# Patient Record
Sex: FEMALE | Race: WHITE | Employment: UNEMPLOYED | URBAN - METROPOLITAN AREA
[De-identification: names, ages, dates, MRNs, and addresses within clinical notes are randomized per-mention and may not be internally consistent; named-entity substitution may affect disease eponyms.]

---
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
## 2020-08-28 ENCOUNTER — HOSPITAL ENCOUNTER (OUTPATIENT)
Age: 20
Setting detail: SPECIMEN
Discharge: HOME | End: 2020-08-28
Payer: COMMERCIAL

## 2020-08-30 LAB
C. TRACHOMATIS DNA ,URINE: NEGATIVE
N. GONORRHOEAE DNA, URINE: NEGATIVE
SPECIMEN DESCRIPTION: NORMAL

## 2021-02-23 ENCOUNTER — HOSPITAL ENCOUNTER (OUTPATIENT)
Facility: CLINIC | Age: 21
Discharge: HOME | End: 2021-02-23
Payer: COMMERCIAL

## 2021-02-23 LAB
ABSOLUTE EOS #: 0.24 K/UL (ref 0–0.44)
ABSOLUTE IMMATURE GRANULOCYTE: <0.03 K/UL (ref 0–0.3)
ABSOLUTE LYMPH #: 1.76 K/UL (ref 1.1–3.7)
ABSOLUTE MONO #: 0.41 K/UL (ref 0.1–1.4)
BASOPHILS # BLD: 0 % (ref 0–2)
BASOPHILS ABSOLUTE: <0.03 K/UL (ref 0–0.2)
DIFFERENTIAL TYPE: NORMAL
EOSINOPHILS RELATIVE PERCENT: 4 % (ref 1–4)
HCT VFR BLD CALC: 41.9 % (ref 36.3–47.1)
HEMOGLOBIN: 13.9 G/DL (ref 11.9–15.1)
IMMATURE GRANULOCYTES: 0 %
LYMPHOCYTES # BLD: 28 % (ref 25–45)
MCH RBC QN AUTO: 28.8 PG (ref 25.2–33.5)
MCHC RBC AUTO-ENTMCNC: 33.2 G/DL (ref 28.4–34.8)
MCV RBC AUTO: 86.9 FL (ref 82.6–102.9)
MONOCYTES # BLD: 7 % (ref 2–8)
NRBC AUTOMATED: 0 PER 100 WBC
PDW BLD-RTO: 12.1 % (ref 11.8–14.4)
PLATELET # BLD: 293 K/UL (ref 138–453)
PLATELET ESTIMATE: NORMAL
PMV BLD AUTO: 11 FL (ref 8.1–13.5)
RBC # BLD: 4.82 M/UL (ref 3.95–5.11)
RBC # BLD: NORMAL 10*6/UL
SEG NEUTROPHILS: 61 % (ref 34–64)
SEGMENTED NEUTROPHILS ABSOLUTE COUNT: 3.85 K/UL (ref 1.5–8.1)
WBC # BLD: 6.3 K/UL (ref 4.5–13.5)
WBC # BLD: NORMAL 10*3/UL

## 2021-02-23 PROCEDURE — 82306 VITAMIN D 25 HYDROXY: CPT

## 2021-02-23 PROCEDURE — 84443 ASSAY THYROID STIM HORMONE: CPT

## 2021-02-23 PROCEDURE — 85025 COMPLETE CBC W/AUTO DIFF WBC: CPT

## 2021-02-23 PROCEDURE — 36415 COLL VENOUS BLD VENIPUNCTURE: CPT

## 2021-02-23 PROCEDURE — 82728 ASSAY OF FERRITIN: CPT

## 2021-02-23 PROCEDURE — 86140 C-REACTIVE PROTEIN: CPT

## 2021-02-23 PROCEDURE — 84439 ASSAY OF FREE THYROXINE: CPT

## 2021-02-23 PROCEDURE — 80053 COMPREHEN METABOLIC PANEL: CPT

## 2021-02-24 LAB
ALBUMIN SERPL-MCNC: 4.1 G/DL (ref 3.5–5.2)
ALBUMIN/GLOBULIN RATIO: 1.2 (ref 1–2.5)
ALP BLD-CCNC: 76 U/L (ref 35–104)
ALT SERPL-CCNC: 18 U/L (ref 5–33)
ANION GAP SERPL CALCULATED.3IONS-SCNC: 15 MMOL/L (ref 9–17)
AST SERPL-CCNC: 16 U/L
BILIRUB SERPL-MCNC: 0.29 MG/DL (ref 0.3–1.2)
BUN BLDV-MCNC: 9 MG/DL (ref 6–20)
BUN/CREAT BLD: ABNORMAL (ref 9–20)
C-REACTIVE PROTEIN: 4 MG/L (ref 0–5)
CALCIUM SERPL-MCNC: 9.1 MG/DL (ref 8.6–10.4)
CHLORIDE BLD-SCNC: 108 MMOL/L (ref 98–107)
CO2: 19 MMOL/L (ref 20–31)
CREAT SERPL-MCNC: 0.64 MG/DL (ref 0.5–0.9)
FERRITIN: 54 UG/L (ref 13–150)
GFR AFRICAN AMERICAN: >60 ML/MIN
GFR NON-AFRICAN AMERICAN: >60 ML/MIN
GFR SERPL CREATININE-BSD FRML MDRD: ABNORMAL ML/MIN/{1.73_M2}
GFR SERPL CREATININE-BSD FRML MDRD: ABNORMAL ML/MIN/{1.73_M2}
GLUCOSE BLD-MCNC: 118 MG/DL (ref 70–99)
POTASSIUM SERPL-SCNC: 4.2 MMOL/L (ref 3.7–5.3)
SODIUM BLD-SCNC: 142 MMOL/L (ref 135–144)
THYROXINE, FREE: 1.24 NG/DL (ref 0.93–1.7)
TOTAL PROTEIN: 7.6 G/DL (ref 6.4–8.3)
TSH SERPL DL<=0.05 MIU/L-ACNC: 2.01 MIU/L (ref 0.3–5)
VITAMIN D 25-HYDROXY: 18.1 NG/ML (ref 30–100)

## 2021-04-13 ENCOUNTER — HOSPITAL ENCOUNTER (OUTPATIENT)
Age: 21
Setting detail: SPECIMEN
Discharge: HOME | End: 2021-04-13
Payer: COMMERCIAL

## 2021-04-13 ENCOUNTER — OFFICE VISIT (OUTPATIENT)
Dept: OBGYN CLINIC | Age: 21
End: 2021-04-13
Payer: COMMERCIAL

## 2021-04-13 VITALS
WEIGHT: 247.8 LBS | HEART RATE: 100 BPM | BODY MASS INDEX: 41.29 KG/M2 | HEIGHT: 65 IN | SYSTOLIC BLOOD PRESSURE: 130 MMHG | TEMPERATURE: 98.2 F | DIASTOLIC BLOOD PRESSURE: 78 MMHG

## 2021-04-13 DIAGNOSIS — N94.6 DYSMENORRHEA: ICD-10-CM

## 2021-04-13 DIAGNOSIS — Z01.419 WOMEN'S ANNUAL ROUTINE GYNECOLOGICAL EXAMINATION: Primary | ICD-10-CM

## 2021-04-13 PROCEDURE — G0145 SCR C/V CYTO,THINLAYER,RESCR: HCPCS

## 2021-04-13 PROCEDURE — 99385 PREV VISIT NEW AGE 18-39: CPT | Performed by: NURSE PRACTITIONER

## 2021-04-13 RX ORDER — MEDROXYPROGESTERONE ACETATE 150 MG/ML
1 INJECTION, SUSPENSION INTRAMUSCULAR
COMMUNITY

## 2021-04-13 SDOH — HEALTH STABILITY: MENTAL HEALTH: HOW MANY STANDARD DRINKS CONTAINING ALCOHOL DO YOU HAVE ON A TYPICAL DAY?: NOT ASKED

## 2021-04-13 SDOH — HEALTH STABILITY: MENTAL HEALTH: HOW OFTEN DO YOU HAVE A DRINK CONTAINING ALCOHOL?: MONTHLY OR LESS

## 2021-04-13 ASSESSMENT — PATIENT HEALTH QUESTIONNAIRE - PHQ9
SUM OF ALL RESPONSES TO PHQ QUESTIONS 1-9: 0
2. FEELING DOWN, DEPRESSED OR HOPELESS: 0
1. LITTLE INTEREST OR PLEASURE IN DOING THINGS: 0

## 2021-04-13 NOTE — PROGRESS NOTES
History and Physical  [address], [address]  [address], [address]. [phone]   Fax [phone]  Tyler Ma  2021              20 y.o. Chief Complaint   Patient presents with    Established New Doctor       No LMP recorded (lmp unknown). Patient has had an injection. Primary Care Physician: Andrea Knight MD    The patient was seen and examined. She has no chief complaint today and is here for her annual exam.  Her bowels are regular. There are no voiding complaints. She denies any bloating. She denies vaginal discharge and was counseled on STD's and the need for barrier contraception. HPI : Tyler Ma is a 23 y.o. female [place]    Annual exam  Here to establish care  Previously receiving depo injections at her pediatricians office, DR Judd Brasher- Pediatric care. Desires to continue here instead. Next dose due 2021. Been on depo injections for 2 years for irregular menses. Not having menses while on depo. Currently sexually active.   Declines STD testing.  ________________________________________________________________________  OB History    Para Term  AB Living   0 0 0 0 0 0   SAB TAB Ectopic Molar Multiple Live Births   0 0 0 0 0 0     Past Medical History:   Diagnosis Date    Cholecystitis     gallstones                                                                   Past Surgical History:   Procedure Laterality Date    CHOLECYSTECTOMY, LAPAROSCOPIC  2017    CHOLECYSTECTOMY, LAPAROSCOPIC N/A 2017    CHOLECYSTECTOMY LAPAROSCOPIC, POSSIBLE COMMON BILE DUCT EXPLORATION, POSSIBLE IOC, **SHORT STAY** performed by Herminio Patel MD at [address] [facility]       Family History   Problem Relation Age of Onset    Other Maternal Grandmother         gallbladder disease    Other Paternal Grandmother         gallbladder disease    Bleeding Prob Neg Hx     Seizures Neg Hx     Known Allergies)       Symptoms of decreased mood absent  Symptoms of anhedonia absent    **If either question is answered in a  positive fashion then complete the PHQ9 Scoring Evaluation and make the appropriate referral**      Immunization status: stated as current, but no records available. Gynecologic History:  Menarche: 14 yo  Menopause at [age] yo     No LMP recorded (lmp unknown). Patient has had an injection. Sexually Active: Yes    STD History: No     Permanent Sterilization: No   Reversible Birth Control: No        Hormone Replacement Exposure: No      Genetic Qualified Family History of Breast, Ovarian , Colon or Uterine Cancer: No     If YES see scanned worksheet. Preventative Health Testing:    Health Maintenance:  Health Maintenance Due   Topic Date Due    Hepatitis C screen  Never done    HIV screen  Never done    COVID-19 Vaccine (1) Never done    Cervical cancer screen  Never done       Date of Last Pap Smear: NA  Abnormal Pap Smear History: NA  Colposcopy History:   Date of Last Mammogram: NA  Date of Last Colonoscopy:   Date of Last Bone Density:      ________________________________________________________________________        REVIEW OF SYSTEMS:    yes   A minimum of an eleven point review of systems was completed. Review Of Systems (11 point):  Constitutional: No fever, chills or malaise;  No weight change or fatigue  Head and Eyes: No vision, Headache, Dizziness or trauma in last 12 months  ENT ROS: No hearing, Tinnitis, sinus or taste problems  Hematological and Lymphatic ROS:No Lymphoma, Von Willebrand's, Hemophillia or Bleeding History  Psych ROS: No Depression, Homicidal thoughts,suicidal thoughts, or anxiety  Breast ROS: No prior breast abnormalities or lumps  Respiratory ROS: No SOB, Pneumoniae,Cough, or Pulmonary Embolism History  Cardiovascular ROS: No Chest Pain with Exertion, Palpitations, Syncope, Edema, Arrhythmia  Gastrointestinal ROS: No Indigestion, Heartburn, Nausea, vomiting, Diarrhea, Constipation,or Bowel Changes; No Bloody Stools or melena  Genito-Urinary ROS: No Dysuria, Hematuria or Nocturia. No Urinary Incontinence or Vaginal Discharge  Musculoskeletal ROS: No Arthralgia, Arthritis,Gout,Osteoporosis or Rheumatism  Neurological ROS: No CVA, Migraines, Epilepsy, Seizure Hx, or Limb Weakness  Dermatological ROS: No Rash, Itching, Hives, Mole Changes or Cancer                                                                                                                                                                                                                                  PHYSICAL Exam:     Constitutional:  Vitals:    04/13/21 1139   BP: 130/78   Site: Left Upper Arm   Position: Sitting   Cuff Size: Large Adult   Pulse: 100   Temp: 98.2 °F (36.8 °C)   TempSrc: Temporal   Weight: 247 lb 12.8 oz (112.4 kg)   Height: 5' 5\" (1.651 m)       Chaperone for Intimate Exam   Chaperone was offered and accepted as part of the rooming process.  Chaperone: Netta          General Appearance: This  is a well Developed, well Nourished, well groomed female. Her BMI was reviewed. Nutritional decision making was discussed. Skin:  There was a Normal Inspection of the skin without rashes or lesions. There were no rashes. (Papular, Maculopapular, Hives, Pustular, Macular)     There were no lesions (Ulcers, Erythema, Abn. Appearing Nevi)            Lymphatic:  No Lymph Nodes were Palpable in the neck , axilla or groin.  0 # Of Lymph Nodes; Location ; Character [Normal]  [Shotty] [Tender] [Enlarged]     Neck and EENT:  The neck was supple. There were no masses   The thyroid was not enlarged and had no masses. Perrla, EOMI B/L, TMI B/L No Abnormalities. Throat inspected-No exudates or Masses, Nares Patent No Masses        Respiratory: The lungs were auscultated and found to be clear. There were no rales, rhonchi or wheezes. There was a good respiratory effort. Cardiovascular: The heart was in a regular rate and rhythm. . No S3 or S4. There was no murmur appreciated. Location, grade, and radiation are not applicable. Extremities: The patients extremities were without calf tenderness, edema, or varicosities. There was full range of motion in all four extremities. Pulses in all four extremities were appreciated and are 2/4. Abdomen: The abdomen was soft and non-tender. There were good bowel sounds in all quadrants and there was no guarding, rebound or rigidity. On evaluation there was no evidence of hepatosplenomegaly and there was no costal vertebral kiana tenderness bilaterally. No hernias were appreciated. Abdominal Scars: cholecystectomy scar    Psych: The patient had a normal Orientation to: Time, Place, Person, and Situation  There is no Mood / Affect changes    Breast:  (Chest)  normal appearance, no masses or tenderness. Left breast > right breast  Self breast exams were reviewed in detail. Literature was given. Pelvic Exam:  Vulva and vagina appear normal. Bimanual exam reveals normal uterus and adnexa. Rectal Exam:  exam declined by patient          Musculosk:  Normal Gait and station was noted. Digits were evaluated without abnormal findings. Range of motion, stability and strength were evaluated and found to be appropriate for the patients age. ASSESSMENT:      23 y.o. Annual   Diagnosis Orders   1. Women's annual routine gynecological examination  PAP SMEAR          Chief Complaint   Patient presents with   Alvia Brunner Doctor          Past Medical History:   Diagnosis Date    Cholecystitis     gallstones         There are no active problems to display for this patient. Hereditary Breast, Ovarian, Colon and Uterine Cancer screening Done. Tobacco & Secondary smoke risks reviewed; instructed on cessation and avoidance      Counseling Completed:  Preventative Health Recommendations and Follow up.   The patient was informed of the recommended preventative health recommendations. 1. Annuals every year; Cytology collections per prevailing guidelines. 2. Mammograms begin every year at 37 yo if no abnormalities are found and no family history. 3. Bone density studies every 2-3 years. Begin at 73 yo. If no fracture history or osteoporosis family history. (significant). 4. Colonoscopy begin at 40 yo. Repeat every ten years if negative and no family history. 5. Calcium of 7311-5979 mg/day in split dosing  6. Vitamin D 400-800 IU/day  7. All other preventative health recommendations will be managed by the patients Primary care physician. Counseling Hormonal Based Birth Control:      The patient was seen and counseled on all forms of birth control both male and female  reversible and non. She is aware that hormonal based birth control may increase her risk of developing a blood clot which may increase her morbidity and or mortality. She was counseled on alternate non hormonal based contraception options. We discussed that smoking and any hormonal based contraception may increase the patients risks of developing these life threatening blood clots. All patients are encouraged to stop smoking at the time of contraceptive counseling. Cessation programs were reviewed. The patient was instructed to use barrier contraception for sexually transmitted disease prevention. The patient was also informed of antibiotics decreasing contraceptive efficacy and the need for barrier contraception from the onset of her antibiotic dosing and through a minimum of thirty days from antibiotic cessation. The life threatening side effect profile was reviewed in detail this includes but is not limited to shortness of breath, chest pain, severe or persistent headaches, or calf pain.   If any of these occur the patient has been instructed to stop using her hormonal based contraception, notify the office, and go to the emergency department or call 911. The patient denied any personal history of blood clots in her leg, lung, or heart and denied any family history of stroke, TIA, sudden cardiac death < 36 y.o.,pulmonary embolism, or deep venous thrombosis. PLAN:  Return in about 1 month (around 5/13/2021) for depo injection. Pap smear obtained. Signs hormone consent form. Will return to our office for next depo- May 13. Repeat Annual every 1 year  Cervical Cytology Evaluation begins at 24years old. If Negative Cytology, Follow-up screening per current guidelines. Mammograms every 1 year. If 35 yo and last mammogram was negative. Calcium and Vitamin D dosing reviewed. Colonoscopy screening reviewed as well as onset for bone density testing. Birth control and barrier recommendations discussed. STD counseling and prevention reviewed. Gardisil counseling completed for all patients 10-35 yo. Routine health maintenance per patients PCP. Orders Placed This Encounter   Procedures    PAP SMEAR     Patient History:    No LMP recorded (lmp unknown). Patient has had an injection. OBGYN Status: Injection  Past Surgical History:  09/22/2017: CHOLECYSTECTOMY, LAPAROSCOPIC  9/22/2017: CHOLECYSTECTOMY, LAPAROSCOPIC; N/A      Comment:  CHOLECYSTECTOMY LAPAROSCOPIC, POSSIBLE COMMON BILE DUCT                EXPLORATION, POSSIBLE IOC, **SHORT STAY** performed by                Trevor Coles MD at [place] [address]  No date: TYMPANOSTOMY TUBE PLACEMENT  Medications/Contraceptives Affecting Cytology     Progestin Contraceptives - Injectable Disp Start End     medroxyPROGESTERone (DEPO-PROVERA) 150 MG/ML injection          Class: Historical Med       Social History    Tobacco Use      Smoking status: Never Smoker      Smokeless tobacco: Never Used       Standing Status:   Future     Standing Expiration Date:   4/13/2022     Order Specific Question:   Collection Type     Answer:    Thin Prep     Order Specific Question:   Prior Abnormal Pap Test Answer:   No     Order Specific Question:   Screening or Diagnostic     Answer:   Screening     Order Specific Question:   HPV Requested? Answer:   N/A     Order Specific Question:   High Risk Patient     Answer:   N/A           The patient, Nelly Sapp is a 23 y.o. female, was seen with a total time spent of 20 minutes for the visit on this date of service by the E/M provider. The time component had both face to face and non face to face time spent in determining the total time component. Counseling and education regarding her diagnosis listed below and her options regarding those diagnoses were also included in determining her time component. Diagnosis Orders   1. Women's annual routine gynecological examination  PAP SMEAR        The patient had her preventative health maintenance recommendations and follow-up reviewed with her at the completion of her visit.

## 2021-04-15 LAB — CYTOLOGY REPORT: NORMAL

## 2021-05-13 ENCOUNTER — NURSE ONLY (OUTPATIENT)
Dept: OBGYN CLINIC | Age: 21
End: 2021-05-13
Payer: COMMERCIAL

## 2021-05-13 VITALS
SYSTOLIC BLOOD PRESSURE: 126 MMHG | DIASTOLIC BLOOD PRESSURE: 86 MMHG | BODY MASS INDEX: 41.1 KG/M2 | HEART RATE: 80 BPM | WEIGHT: 247 LBS

## 2021-05-13 DIAGNOSIS — N94.6 DYSMENORRHEA: Primary | ICD-10-CM

## 2021-05-13 DIAGNOSIS — Z32.02 NEGATIVE PREGNANCY TEST: ICD-10-CM

## 2021-05-13 LAB
CONTROL: NORMAL
PREGNANCY TEST URINE, POC: NEGATIVE

## 2021-05-13 PROCEDURE — 96372 THER/PROPH/DIAG INJ SC/IM: CPT | Performed by: NURSE PRACTITIONER

## 2021-05-13 PROCEDURE — 81025 URINE PREGNANCY TEST: CPT | Performed by: NURSE PRACTITIONER

## 2021-05-13 RX ORDER — MEDROXYPROGESTERONE ACETATE 150 MG/ML
150 INJECTION, SUSPENSION INTRAMUSCULAR ONCE
Status: COMPLETED | OUTPATIENT
Start: 2021-05-13 | End: 2021-05-13

## 2021-05-13 RX ADMIN — MEDROXYPROGESTERONE ACETATE 150 MG: 150 INJECTION, SUSPENSION INTRAMUSCULAR at 11:25

## 2021-05-13 NOTE — PROGRESS NOTES
Patient presents to office for depo injection. Patient within 12 weeks since last administration; last given by pediatrician's office. Patient current with office for annual exam, due after 4/13/22. Urine hcg test completed prior to administration, resulting negative. Depo administrated per provider's orders. Patient tolerated injection to right deltoid muscle well. See MAR for details. 12 week injection scheduled upon check out.       Depo  Lot [id number]  Exp 8/22

## 2021-07-27 ENCOUNTER — NURSE TRIAGE (OUTPATIENT)
Dept: OTHER | Facility: CLINIC | Age: 21
End: 2021-07-27

## 2021-07-27 ENCOUNTER — APPOINTMENT (OUTPATIENT)
Dept: INTERVENTIONAL RADIOLOGY/VASCULAR | Age: 21
DRG: 103 | End: 2021-07-27
Payer: COMMERCIAL

## 2021-07-27 ENCOUNTER — APPOINTMENT (OUTPATIENT)
Dept: MRI IMAGING | Age: 21
DRG: 103 | End: 2021-07-27
Payer: COMMERCIAL

## 2021-07-27 ENCOUNTER — HOSPITAL ENCOUNTER (EMERGENCY)
Age: 21
Discharge: HOME | DRG: 103 | End: 2021-07-27
Attending: EMERGENCY MEDICINE
Payer: COMMERCIAL

## 2021-07-27 VITALS
TEMPERATURE: 97.4 F | RESPIRATION RATE: 18 BRPM | HEART RATE: 85 BPM | DIASTOLIC BLOOD PRESSURE: 81 MMHG | OXYGEN SATURATION: 100 % | SYSTOLIC BLOOD PRESSURE: 135 MMHG

## 2021-07-27 DIAGNOSIS — G93.2 IIH (IDIOPATHIC INTRACRANIAL HYPERTENSION): ICD-10-CM

## 2021-07-27 DIAGNOSIS — H47.10 PAPILLEDEMA: Primary | ICD-10-CM

## 2021-07-27 LAB
APPEARANCE CSF: CLEAR
CRYPTOCOCCUS NEOFORMANS/GATTI CSF FILM ARR.: NOT DETECTED
CYTOMEGALOVIRUS (CMV) CSF FILM ARRAY: NOT DETECTED
ENTEROVIRUS CSF FILM ARRAY: NOT DETECTED
ESCHERICHIA COLI K1 CSF FILM ARRAY: NOT DETECTED
GLUCOSE, CSF: 50 MG/DL (ref 40–70)
HAEMOPHILUS INFLUENZA CSF FILM ARRAY: NOT DETECTED
HCG(URINE) PREGNANCY TEST: NEGATIVE
HHV-6 (HERPESVIRUS 6) CSF FILM ARRAY: NOT DETECTED
HSV-1 CSF FILM ARRAY: NOT DETECTED
HSV-2 CSF FILM ARRAY: NOT DETECTED
LISTERIA MONOCYTOGENES CSF FILM ARRAY: NOT DETECTED
NEISSERIA MENIGITIDIS CSF FILM ARRAY: NOT DETECTED
PARECHOVIRUS CSF FILM ARRAY: NOT DETECTED
PROTEIN CSF: 22.1 MG/DL (ref 15–45)
RBC CSF: 0 /MM3
SPECIMEN DESCRIPTION: NORMAL
STREPTOCOCCUS AGALACTIAE CSF FILM ARRAY: NOT DETECTED
STREPTOCOCCUS PNEUMONIAE CSF FILM ARRAY: NOT DETECTED
SUPERNAT COLOR CSF: NORMAL
TUBE NUMBER CSF: 3
VARICELLA-ZOSTER CSF FILM ARRAY: NOT DETECTED
VOLUME CSF: 7
WBC CSF: 0 /MM3
XANTHOCHROMIA: NORMAL

## 2021-07-27 PROCEDURE — 84157 ASSAY OF PROTEIN OTHER: CPT

## 2021-07-27 PROCEDURE — 2709999900 HC NON-CHARGEABLE SUPPLY

## 2021-07-27 PROCEDURE — 82945 GLUCOSE OTHER FLUID: CPT

## 2021-07-27 PROCEDURE — 6360000004 HC RX CONTRAST MEDICATION: Performed by: STUDENT IN AN ORGANIZED HEALTH CARE EDUCATION/TRAINING PROGRAM

## 2021-07-27 PROCEDURE — 89050 BODY FLUID CELL COUNT: CPT

## 2021-07-27 PROCEDURE — 87483 CNS DNA AMP PROBE TYPE 12-25: CPT

## 2021-07-27 PROCEDURE — 62328 DX LMBR SPI PNXR W/FLUOR/CT: CPT | Performed by: RADIOLOGY

## 2021-07-27 PROCEDURE — 81025 URINE PREGNANCY TEST: CPT

## 2021-07-27 PROCEDURE — 70546 MR ANGIOGRAPH HEAD W/O&W/DYE: CPT

## 2021-07-27 PROCEDURE — 87070 CULTURE OTHR SPECIMN AEROBIC: CPT

## 2021-07-27 PROCEDURE — 009U3ZX DRAINAGE OF SPINAL CANAL, PERCUTANEOUS APPROACH, DIAGNOSTIC: ICD-10-PCS | Performed by: RADIOLOGY

## 2021-07-27 PROCEDURE — 99284 EMERGENCY DEPT VISIT MOD MDM: CPT

## 2021-07-27 PROCEDURE — 70553 MRI BRAIN STEM W/O & W/DYE: CPT

## 2021-07-27 PROCEDURE — 6370000000 HC RX 637 (ALT 250 FOR IP): Performed by: STUDENT IN AN ORGANIZED HEALTH CARE EDUCATION/TRAINING PROGRAM

## 2021-07-27 PROCEDURE — 87205 SMEAR GRAM STAIN: CPT

## 2021-07-27 PROCEDURE — 87015 SPECIMEN INFECT AGNT CONCNTJ: CPT

## 2021-07-27 PROCEDURE — A9576 INJ PROHANCE MULTIPACK: HCPCS | Performed by: STUDENT IN AN ORGANIZED HEALTH CARE EDUCATION/TRAINING PROGRAM

## 2021-07-27 RX ORDER — ACETAZOLAMIDE 250 MG/1
250 TABLET ORAL 2 TIMES DAILY
Qty: 90 TABLET | Refills: 3 | Status: SHIPPED | OUTPATIENT
Start: 2021-07-27 | End: 2022-02-24 | Stop reason: SDUPTHER

## 2021-07-27 RX ORDER — ACETAZOLAMIDE 250 MG/1
250 TABLET ORAL 2 TIMES DAILY
Status: DISCONTINUED | OUTPATIENT
Start: 2021-07-27 | End: 2021-07-27 | Stop reason: HOSPADM

## 2021-07-27 RX ORDER — SODIUM CHLORIDE 0.9 % (FLUSH) 0.9 %
5-40 SYRINGE (ML) INJECTION 2 TIMES DAILY
Status: DISCONTINUED | OUTPATIENT
Start: 2021-07-27 | End: 2021-07-27 | Stop reason: HOSPADM

## 2021-07-27 RX ADMIN — ACETAZOLAMIDE 250 MG: 250 TABLET ORAL at 15:09

## 2021-07-27 RX ADMIN — GADOTERIDOL 20 ML: 279.3 INJECTION, SOLUTION INTRAVENOUS at 13:11

## 2021-07-27 ASSESSMENT — ENCOUNTER SYMPTOMS
EYE PAIN: 0
SHORTNESS OF BREATH: 0
BACK PAIN: 0
EYE REDNESS: 0
PHOTOPHOBIA: 0
ABDOMINAL DISTENTION: 0
PHOTOPHOBIA: 1
APNEA: 0
WHEEZING: 0
VOMITING: 0
DIARRHEA: 0
CONSTIPATION: 0
ABDOMINAL PAIN: 0
CHEST TIGHTNESS: 0
EYE DISCHARGE: 0
STRIDOR: 0
COUGH: 0
NAUSEA: 0
SORE THROAT: 0

## 2021-07-27 NOTE — BRIEF OP NOTE
Brief Postoperative Note Lumbar Puncture    Glo Uriarte  YOB: 2000  [id number]    Pre-operative Diagnosis: Papilledema     Post-operative Diagnosis: Same    Procedure: Fluoro guided Lumbar Puncture    Anesthesia: 1% Lidocaine    Surgeons/Assistants: Kyara Arenas PA-C    Complications: None    EBL: Minimal    Specimens: Obtained and sent to lab    Fluoroscopy guided lumbar puncture. 20 gauge spinal needle. L3-L4. 10 ml clear CSF obtained. Opening pressure of 28 cm of H2O and closing pressure of 18 cm of H2O Dressing applied. Instructions given.     Electronically signed by AMARIS Lucia on 7/27/2021 at 3:59 PM

## 2021-07-27 NOTE — CONSULTS
Neurology Consult Note      Reason for Consult:  papillidema  Requesting Physician:  Dr. Colón Door:  papillidema    History Obtained From:  patient, mother, electronic medical record       HISTORY OF PRESENT ILLNESS:              The patient is a 23 y.o. female presenting today as a referral from her optometrists office for bilateral papilledema. She was at the office for a routine glasses check. Reports a week long headache that is bifrontal R>L non radiating, pounding with photophobia that started suddenly 7 days prior. Reports being on acne medication and depo-provera. Denies visual disturbance, loss of vision or trauma. She reports no fevers, chills, night sweats or unexpected weight loss. BMI 41, non smoker. Past Medical History:        Diagnosis Date    Cholecystitis     gallstones     Past Surgical History:        Procedure Laterality Date    CHOLECYSTECTOMY, LAPAROSCOPIC  09/22/2017    CHOLECYSTECTOMY, LAPAROSCOPIC N/A 9/22/2017    CHOLECYSTECTOMY LAPAROSCOPIC, POSSIBLE COMMON BILE DUCT EXPLORATION, POSSIBLE IOC, **SHORT STAY** performed by Dov Leiva MD at [id number] [place] [address]       Current Medications:   No current facility-administered medications for this encounter. Allergies:  Patient has no known allergies. Social History:  TOBACCO:   reports that she has never smoked. She has never used smokeless tobacco.  ETOH:   reports current alcohol use. DRUGS:   reports no history of drug use. ACTIVITIES OF DAILY LIVING:    INSTRUMENTAL ACTIVITIES OF DAILY LIVING:  Patient is able to perform all instrumental activities of daily living.     Family History:       Problem Relation Age of Onset    Other Maternal Grandmother         gallbladder disease    Other Paternal Grandmother         gallbladder disease    Bleeding Prob Neg Hx     Seizures Neg Hx     Anesth Problems Neg Hx        REVIEW OF SYSTEMS:  Review of Systems   Constitutional: Negative for activity change, appetite change, chills, diaphoresis, fatigue, fever and unexpected weight change. HENT: Negative for sore throat. Eyes: Positive for photophobia. Negative for discharge, redness and visual disturbance. Respiratory: Negative for apnea, cough, chest tightness, shortness of breath, wheezing and stridor. Cardiovascular: Negative for chest pain and leg swelling. Gastrointestinal: Negative for abdominal distention, abdominal pain, constipation, diarrhea, nausea and vomiting. Genitourinary: Negative for difficulty urinating, dysuria, flank pain, frequency, hematuria and urgency. Musculoskeletal: Negative for arthralgias. Neurological: Positive for headaches. Negative for dizziness, tremors, seizures, syncope, facial asymmetry, speech difficulty, weakness, light-headedness and numbness. Hematological: Negative for adenopathy. PHYSICAL EXAM:    Vitals:  BP (!) 142/94   Pulse 84   Temp 97.4 °F (36.3 °C)   Resp 18   SpO2 99%        Physical Exam  Vitals and nursing note reviewed. Constitutional:       General: She is not in acute distress. Appearance: She is well-developed. She is not diaphoretic. HENT:      Head: Normocephalic and atraumatic. Right Ear: External ear normal.      Left Ear: External ear normal.   Eyes:      General: No scleral icterus. Right eye: No discharge. Left eye: No discharge. Conjunctiva/sclera: Conjunctivae normal.      Funduscopic exam:     Right eye: Papilledema present. Red reflex present. Left eye: Papilledema present. Red reflex present. Pulmonary:      Effort: Pulmonary effort is normal.   Abdominal:      General: There is no distension. Palpations: Abdomen is soft. Tenderness: There is no abdominal tenderness. There is no guarding. Musculoskeletal:         General: No tenderness or deformity. Normal range of motion. Cervical back: Normal range of motion.    Skin:     General: Skin is warm and dry. Capillary Refill: Capillary refill takes less than 2 seconds. Findings: No erythema or rash. Neurological:      Mental Status: She is alert and oriented to person, place, and time. GCS: GCS eye subscore is 4. GCS verbal subscore is 5. GCS motor subscore is 6. Cranial Nerves: No cranial nerve deficit. Sensory: No sensory deficit. Motor: No abnormal muscle tone or seizure activity. Coordination: Coordination normal.      Deep Tendon Reflexes: Reflexes are normal and symmetric. Reflexes normal.      Reflex Scores:       Tricep reflexes are 2+ on the right side and 2+ on the left side. Bicep reflexes are 2+ on the right side and 2+ on the left side. Brachioradialis reflexes are 2+ on the right side and 2+ on the left side. Patellar reflexes are 2+ on the right side and 2+ on the left side. Achilles reflexes are 2+ on the right side and 2+ on the left side. Psychiatric:         Behavior: Behavior normal.         IMAGING    MRV HEAD W WO CONTRAST    Result Date: 7/27/2021  Unremarkable MRV of the head. MRI BRAIN W WO CONTRAST    Result Date: 7/27/2021  1. Flattening of the posterior globes at the optic nerve insertions compatible with provided history of papilledema. 2. No intracranial mass, acute infarction, hydrocephalus, or midline shift. IMPRESSION     Case of a 22 yo F referred to the ED by her optometrist for papilledema during a routine eye exam. Bifrontal R>L bifrontal headaches ongoing for 1 week suspicious for IIH. Non focal exam.      RECOMMENDATIONS:   1. MRI Brain W WO - consistent with IIH  2. MRV Brain W WO - negative  3. Plan for LP with opening/closing pressures. Additionally will obtain basic CSF studies. 1. Lay flat for 2 hours. 4. Start diamox 250 mg PO BID  5. Follow up with neurology in 2 weeks    Thank you for the consultation. Will follow.        Dottie Barroso MD, [facility]  PGY-4 Neurology  7/27/2021 at 11:41

## 2021-07-27 NOTE — TELEPHONE ENCOUNTER
Reason for Disposition   Caller has already spoken with the PCP and has no further questions. Protocols used: NO CONTACT OR DUPLICATE CONTACT CALL-ADULT-    Patient was at eye doctor and he sent her to ED for optic nerve swelling.

## 2021-07-27 NOTE — ED PROVIDER NOTES
[facility] [address] [facility]     Emergency Department     Faculty Attestation        I performed a history and physical examination of the patient and discussed management with the resident. I reviewed the residents note and agree with the documented findings and plan of care. Any areas of disagreement are noted on the chart. I was personally present for the key portions of any procedures. I have documented in the chart those procedures where I was not present during the key portions. I have reviewed the emergency nurses triage note. I agree with the chief complaint, past medical history, past surgical history, allergies, medications, social and family history as documented unless otherwise noted below. For mid-level providers such as nurse practitioners as well as physicians assistants:    I have personally seen and evaluated the patient. I find the patient's history and physical exam are consistent with NP/PA documentation. I agree with the care provided, treatment rendered, disposition, & follow-up plan. Additional findings are as noted. Vital Signs: BP (!) 142/94   Pulse 84   Temp 97.4 °F (36.3 °C)   Resp 18   SpO2 99%   PCP:  Jane Deras MD    Pertinent Comments:     Patient had a routine eye exam today ophthalmologist was noted to have papilledema. Patient has had a mild headache for the past week is on birth control. No history of intracranial hypertension. She is afebrile nontoxic on exam nonfocal neurological deficit.   Will discuss with neurology in regards to work-up and need for lumbar puncture      Critical Care  None          Alysha Hoang MD    Attending Emergency Medicine Physician              Jose Antonio Nuñez MD  07/27/21 1036

## 2021-07-27 NOTE — ED NOTES
Pt resting on stretcher, in NAD, RR even and unlabored. Pt updated on plan of care. Will continue plan of care. Call light within reach.        Landry Vora RN  07/27/21 0087

## 2021-07-27 NOTE — ED PROVIDER NOTES
[facility] ED  Emergency Department  Emergency Medicine Resident Sign-out     Care of Faye Sharma was assumed from Dr. Deonna Cabrera and is being seen for Other   . The patient's initial evaluation and plan have been discussed with the prior provider who initially evaluated the patient. EMERGENCY DEPARTMENT COURSE / MEDICAL DECISION MAKING:       MEDICATIONS GIVEN:  Orders Placed This Encounter   Medications    gadoteridol (PROHANCE) injection 20 mL    sodium chloride flush 0.9 % injection 5-40 mL    acetaZOLAMIDE (DIAMOX) tablet 250 mg    acetaZOLAMIDE (DIAMOX) 250 MG tablet     Sig: Take 1 tablet by mouth 2 times daily     Dispense:  90 tablet     Refill:  3       LABS / RADIOLOGY:     Labs Reviewed   CULTURE, CSF   MENINGITIS ENCEPHALITIS PANEL CSF, MOLECULAR   GLUCOSE, CSF   PROTEIN, CSF   CSF CELL COUNT WITH DIFFERENTIAL   PREGNANCY, URINE       MRI BRAIN W WO CONTRAST   Preliminary Result   1. Flattening of the posterior globes at the optic nerve insertions   compatible with provided history of papilledema. 2. No intracranial mass, acute infarction, hydrocephalus, or midline shift. MRV HEAD W WO CONTRAST   Final Result   Unremarkable MRV of the head. IR LUMBAR PUNCTURE FOR DIAGNOSIS    (Results Pending)       RECENT VITALS:     Temp: 97.4 °F (36.3 °C),  Pulse: 84, Resp: 18, BP: (!) 142/94, SpO2: 99 %    This patient is a 23 y.o. Female with concerns for ICP secondary to ophthalmology evaluation, patient was sent for MRI, lumbar puncture. Patient notably had a headache behind the right eye which is improved at this time. Plan to get lumbar puncture as per neurology's recommendation, patient to go down to IR for lumbar puncture, will come back for reevaluation, disposition with potential discharge. Patient cleared for discharge     OUTSTANDING TASKS / RECOMMENDATIONS:      1. Patient to be discharge      FINAL IMPRESSION:     1. Papilledema    2.  IIH

## 2021-07-27 NOTE — ED NOTES
Pt to room 23 with c/o headache pain and blurry vision. Pt reports that she was at her eye doctors office who said that she had increased pressure and that they advised pt to come to ED for MRI and possible LP. Pt alert and oriented x4, talking in complete sentences, respirations even and unlabored. Pt acting age appropriate.  White board updated, will continue to plan of care       Kwaku Velarde RN  07/27/21 [id number]

## 2021-07-27 NOTE — PROGRESS NOTES
Pt arrives to room for LP with opening and closing pressures  Placed prone and site prepped and draped  CM Rt and  PA to bedside  Access obtained  Opening pressure is 28cm of h20  draining clear fluid  Specimen obtained  Tolerated well  10ml removed  Closing pressure is 19cm of h20  Access removed and site covered with bandaid  Return to ED

## 2021-07-27 NOTE — ED PROVIDER NOTES
[facility] ED  Emergency Department Encounter  EmergencyMedicine Resident     Pt Gina Weir  MRN: [id number]  [place] 2000  Date of evaluation: 7/27/21  PCP:  Jovita Desai MD    CHIEF COMPLAINT       Chief Complaint   Patient presents with    Other       HISTORY OF PRESENT ILLNESS  (Location/Symptom, Timing/Onset, Context/Setting, Quality, Duration, Modifying Factors, Severity.)      Herminio Morocho is a 23 y.o. female who presents after being seen by her optometrist earlier today and found to have bilateral papilledema. Optometrist called and states he was sending patient were for MRI and lumbar puncture as he is concerned the patient has idiopathic intracranial hypertension. Patient has had a headache for the past week but no vision changes, headache wax and wanes is not the worst take of her life, no neuro deficits. Not any blood thinners. Patient does have history of obesity. Is on Depo shot. PAST MEDICAL / SURGICAL / SOCIAL / FAMILY HISTORY      has a past medical history of Cholecystitis. has a past surgical history that includes Tympanostomy tube placement; Cholecystectomy, laparoscopic (09/22/2017); and Cholecystectomy, laparoscopic (N/A, 9/22/2017). Social History     Socioeconomic History    Marital status: Single     Spouse name: Not on file    Number of children: Not on file    Years of education: Not on file    Highest education level: Not on file   Occupational History    Not on file   Tobacco Use    Smoking status: Never Smoker    Smokeless tobacco: Never Used   Substance and Sexual Activity    Alcohol use: Yes    Drug use: No    Sexual activity: Yes     Birth control/protection: Injection   Other Topics Concern    Not on file   Social History Narrative    She is a senior in high school. She hopes to attend college next year. She plays the [employer] in band.       Social Determinants of Health     Financial Resource Strain:     Difficulty of Paying Living Expenses:    Food Insecurity:     Worried About Running Out of Food in the Last Year:     [address] in the Last Year:    Transportation Needs:     Lack of Transportation (Medical):  Lack of Transportation (Non-Medical):    Physical Activity:     Days of Exercise per Week:     Minutes of Exercise per Session:    Stress:     Feeling of Stress :    Social Connections:     Frequency of Communication with Friends and Family:     Frequency of Social Gatherings with Friends and Family:     Attends [employer] Services:     Active Member of Clubs or Organizations:     Attends Club or Organization Meetings:     Marital Status:    Intimate Partner Violence:     Fear of Current or Ex-Partner:     Emotionally Abused:     Physically Abused:     Sexually Abused:        Family History   Problem Relation Age of Onset    Other Maternal Grandmother         gallbladder disease    Other Paternal Grandmother         gallbladder disease    Bleeding Prob Neg Hx     Seizures Neg Hx     Anesth Problems Neg Hx        Allergies:  Patient has no known allergies. Home Medications:  Prior to Admission medications    Medication Sig Start Date End Date Taking? Authorizing Provider   medroxyPROGESTERone (DEPO-PROVERA) 150 MG/ML injection 1 mL    Historical Provider, MD   omeprazole (PRILOSEC) 10 MG delayed release capsule Take 10 mg by mouth daily    Historical Provider, MD   desmopressin (DDAVP) 0.1 MG tablet Take 0.1 mg by mouth 2 times daily    Historical Provider, MD       REVIEW OF SYSTEMS    (2-9 systems for level 4, 10 or more for level 5)      Review of Systems   Constitutional: Negative for activity change, appetite change, chills and fever. Eyes: Negative for photophobia, pain, redness and visual disturbance. Respiratory: Negative for cough and shortness of breath. Cardiovascular: Negative for chest pain. Gastrointestinal: Negative for abdominal pain.    Genitourinary: Negative for dysuria and urgency. Musculoskeletal: Negative for back pain and gait problem. Skin: Negative for rash. Neurological: Positive for headaches. Negative for dizziness, weakness, light-headedness and numbness. Psychiatric/Behavioral: Negative for agitation and behavioral problems. PHYSICAL EXAM   (up to 7 for level 4, 8 or more for level 5)      INITIAL VITALS:   BP (!) 142/94   Pulse 84   Temp 97.4 °F (36.3 °C)   Resp 18   SpO2 99%     Physical Exam  Vitals reviewed. Constitutional:       General: She is not in acute distress. Appearance: She is obese. She is not ill-appearing, toxic-appearing or diaphoretic. HENT:      Head: Normocephalic and atraumatic. Cardiovascular:      Rate and Rhythm: Normal rate. Pulmonary:      Effort: Pulmonary effort is normal.      Breath sounds: Normal breath sounds. Abdominal:      General: Abdomen is flat. Palpations: Abdomen is soft. Musculoskeletal:         General: No tenderness. Neurological:      General: No focal deficit present. Mental Status: She is alert and oriented to person, place, and time. Cranial Nerves: No cranial nerve deficit. Motor: No weakness.          DIFFERENTIAL  DIAGNOSIS     PLAN (LABS / IMAGING / EKG):  Orders Placed This Encounter   Procedures    Culture, CSF    Meningitis Encephalitis Panel CSF, Molecular    MRI BRAIN W WO CONTRAST    MRV HEAD W WO CONTRAST    IR LUMBAR PUNCTURE FOR DIAGNOSIS    GLUCOSE, CSF    PROTEIN, CSF    CSF cell count with differential    Pregnancy, Urine    Inpatient consult to Neurology    Inpatient consult to IR       MEDICATIONS ORDERED:  Orders Placed This Encounter   Medications    gadoteridol (PROHANCE) injection 20 mL    sodium chloride flush 0.9 % injection 5-40 mL    acetaZOLAMIDE (DIAMOX) tablet 250 mg       DDX: Migraine, idiopathic intracranial hypertension    DIAGNOSTIC RESULTS / EMERGENCY DEPARTMENT COURSE / MDM   :  No results found for this visit on 07/27/21. RADIOLOGY:  Narrative & Impression  EXAMINATION:  MRI OF THE BRAIN WITHOUT AND WITH CONTRAST  7/27/2021 12:08 pm     TECHNIQUE:  Multiplanar multisequence MRI of the head/brain was performed without and  with the administration of intravenous contrast.     COMPARISON:  None.     HISTORY:  ORDERING SYSTEM PROVIDED HISTORY: [facility]  TECHNOLOGIST PROVIDED HISTORY:  [facility]  Decision Support Exception - unselect if not a suspected or confirmed  emergency medical condition->Emergency Medical Condition (MA)  Is the patient pregnant?->No     Photophobia. Papilledema.     FINDINGS:  INTRACRANIAL STRUCTURES/VENTRICLES:  No acute infarction. No mass effect or  midline shift. No acute intracranial hemorrhage. The ventricles and sulci  are normal in size and configuration. The sellar/suprasellar regions appear  unremarkable. The normal signal voids within the major intracranial vessels  appear maintained. No abnormal focus of enhancement is seen within the brain.     ORBITS: Flattening of the posterior globe at the optic nerve insertions, best  appreciated on the post-contrast axial sequence (series 10, image 10). Otherwise, the orbits appear unremarkable.     SINUSES: The visualized paranasal sinuses and mastoid air cells are well  aerated.     BONES/SOFT TISSUES: The bone marrow signal intensity appears normal. The soft  tissues demonstrate no acute abnormality.     IMPRESSION:  1. Flattening of the posterior globes at the optic nerve insertions  compatible with provided history of papilledema.   2. No intracranial mass, acute infarction, hydrocephalus, or midline shift.       EKG  None    All EKG's are interpreted by the Emergency Department Physician who either signs or Co-signs this chart in the absence of a cardiologist.    EMERGENCY DEPARTMENT COURSE/IMPRESSION: 19-year-old obese female present emergency department at the request of her optometrist after being evaluated in found to have bilateral papilledema. Neurology was consulted, ordered MRI, requested lumbar puncture, due to patient's body habitus and the fact that this is a diagnostic procedure discussed with family will have interventional radiology perform lumbar puncture as difficulty palpating landmarks secondary to size. MRI was obtained which shows findings consistent with IH. Point-of-care ultrasound was used to evaluate optic nerve which shows 6 mm in diameter. Patient signed out to Dr. Cally Cummins, awaiting lumbar puncture. Neurology to order Diamox and follow-up as an outpatient. Additional return precautions were discussed. PROCEDURES:  None    CONSULTS:  IP CONSULT TO NEUROLOGY  IP CONSULT TO INTERVENTIONAL RADIOLOGY    CRITICAL CARE:  None    FINAL IMPRESSION      1. Papilledema    2. IIH (idiopathic intracranial hypertension)          DISPOSITION / PLAN     DISPOSITION        PATIENT REFERRED TO:  No follow-up provider specified.     DISCHARGE MEDICATIONS:  New Prescriptions    No medications on file       Aneta De DO  Emergency Medicine Resident    (Please note that portions of thisnote were completed with a voice recognition program.  Efforts were made to edit the dictations but occasionally words are mis-transcribed.)     Aneta De DO  Resident  07/28/21 6842

## 2021-07-29 ENCOUNTER — OFFICE VISIT (OUTPATIENT)
Dept: NEUROLOGY | Age: 21
End: 2021-07-29
Payer: COMMERCIAL

## 2021-07-29 ENCOUNTER — NURSE TRIAGE (OUTPATIENT)
Dept: OTHER | Facility: CLINIC | Age: 21
End: 2021-07-29

## 2021-07-29 ENCOUNTER — CARE COORDINATION (OUTPATIENT)
Dept: OTHER | Facility: CLINIC | Age: 21
End: 2021-07-29

## 2021-07-29 ENCOUNTER — HOSPITAL ENCOUNTER (INPATIENT)
Age: 21
LOS: 1 days | Discharge: HOME | DRG: 103 | End: 2021-07-30
Attending: EMERGENCY MEDICINE | Admitting: PSYCHIATRY & NEUROLOGY
Payer: COMMERCIAL

## 2021-07-29 VITALS
BODY MASS INDEX: 39.7 KG/M2 | DIASTOLIC BLOOD PRESSURE: 82 MMHG | SYSTOLIC BLOOD PRESSURE: 125 MMHG | WEIGHT: 247 LBS | HEART RATE: 94 BPM | HEIGHT: 66 IN | OXYGEN SATURATION: 97 %

## 2021-07-29 DIAGNOSIS — H47.10 PAPILLEDEMA OF BOTH EYES: ICD-10-CM

## 2021-07-29 DIAGNOSIS — G97.1 POST-DURAL PUNCTURE HEADACHE: ICD-10-CM

## 2021-07-29 DIAGNOSIS — G93.2 PSEUDOTUMOR CEREBRI: Primary | ICD-10-CM

## 2021-07-29 DIAGNOSIS — G43.809 OTHER MIGRAINE WITHOUT STATUS MIGRAINOSUS, NOT INTRACTABLE: Primary | ICD-10-CM

## 2021-07-29 PROBLEM — R51.9 HEADACHE: Status: ACTIVE | Noted: 2021-07-29

## 2021-07-29 PROCEDURE — 99282 EMERGENCY DEPT VISIT SF MDM: CPT

## 2021-07-29 PROCEDURE — 1200000000 HC SEMI PRIVATE

## 2021-07-29 PROCEDURE — 99215 OFFICE O/P EST HI 40 MIN: CPT | Performed by: STUDENT IN AN ORGANIZED HEALTH CARE EDUCATION/TRAINING PROGRAM

## 2021-07-29 PROCEDURE — 96375 TX/PRO/DX INJ NEW DRUG ADDON: CPT

## 2021-07-29 PROCEDURE — 6360000002 HC RX W HCPCS: Performed by: GENERAL PRACTICE

## 2021-07-29 PROCEDURE — 96374 THER/PROPH/DIAG INJ IV PUSH: CPT

## 2021-07-29 PROCEDURE — 2580000003 HC RX 258: Performed by: GENERAL PRACTICE

## 2021-07-29 RX ORDER — DEXAMETHASONE SODIUM PHOSPHATE 10 MG/ML
10 INJECTION INTRAMUSCULAR; INTRAVENOUS ONCE
Status: COMPLETED | OUTPATIENT
Start: 2021-07-29 | End: 2021-07-29

## 2021-07-29 RX ORDER — 0.9 % SODIUM CHLORIDE 0.9 %
1000 INTRAVENOUS SOLUTION INTRAVENOUS ONCE
Status: COMPLETED | OUTPATIENT
Start: 2021-07-29 | End: 2021-07-29

## 2021-07-29 RX ORDER — SODIUM CHLORIDE 0.9 % (FLUSH) 0.9 %
5-40 SYRINGE (ML) INJECTION EVERY 12 HOURS SCHEDULED
Status: CANCELLED | OUTPATIENT
Start: 2021-07-29

## 2021-07-29 RX ORDER — PROCHLORPERAZINE EDISYLATE 5 MG/ML
10 INJECTION INTRAMUSCULAR; INTRAVENOUS ONCE
Status: COMPLETED | OUTPATIENT
Start: 2021-07-29 | End: 2021-07-29

## 2021-07-29 RX ORDER — SODIUM CHLORIDE 0.9 % (FLUSH) 0.9 %
5-40 SYRINGE (ML) INJECTION PRN
Status: CANCELLED | OUTPATIENT
Start: 2021-07-29

## 2021-07-29 RX ORDER — KETOROLAC TROMETHAMINE 30 MG/ML
30 INJECTION, SOLUTION INTRAMUSCULAR; INTRAVENOUS ONCE
Status: COMPLETED | OUTPATIENT
Start: 2021-07-29 | End: 2021-07-29

## 2021-07-29 RX ORDER — DIPHENHYDRAMINE HYDROCHLORIDE 50 MG/ML
50 INJECTION INTRAMUSCULAR; INTRAVENOUS ONCE
Status: COMPLETED | OUTPATIENT
Start: 2021-07-29 | End: 2021-07-29

## 2021-07-29 RX ADMIN — KETOROLAC TROMETHAMINE 30 MG: 30 INJECTION, SOLUTION INTRAMUSCULAR; INTRAVENOUS at 20:29

## 2021-07-29 RX ADMIN — SODIUM CHLORIDE 1000 ML: 9 INJECTION, SOLUTION INTRAVENOUS at 20:13

## 2021-07-29 RX ADMIN — DIPHENHYDRAMINE HYDROCHLORIDE 50 MG: 50 INJECTION, SOLUTION INTRAMUSCULAR; INTRAVENOUS at 20:31

## 2021-07-29 RX ADMIN — PROCHLORPERAZINE EDISYLATE 10 MG: 5 INJECTION INTRAMUSCULAR; INTRAVENOUS at 20:32

## 2021-07-29 RX ADMIN — DEXAMETHASONE SODIUM PHOSPHATE 10 MG: 10 INJECTION INTRAMUSCULAR; INTRAVENOUS at 20:30

## 2021-07-29 ASSESSMENT — ENCOUNTER SYMPTOMS
WHEEZING: 0
SORE THROAT: 0
ABDOMINAL PAIN: 0
NAUSEA: 0
DIARRHEA: 0
EYE REDNESS: 0
CHEST TIGHTNESS: 0
VOMITING: 0
APNEA: 0
STRIDOR: 0
COUGH: 0
EYE DISCHARGE: 0
ABDOMINAL DISTENTION: 0
SHORTNESS OF BREATH: 0
CONSTIPATION: 0

## 2021-07-29 ASSESSMENT — PAIN SCALES - GENERAL: PAINLEVEL_OUTOF10: 9

## 2021-07-29 NOTE — PROGRESS NOTES
[address] [place] [address] [address] [facility]  Dept: [phone]  Dept Fax: [phone]    NEUROLOGY NEW PATIENT NOTE       PATIENT NAME: Shereen Ramirez  PATIENT MRN: [id number]  PRIMARY CARE PHYSICIAN: Jacqueline Monique MD      HPI:      Shereen Ramirez is a 23 y.o. female      The patient is a 23 y.o. female presenting today as a referral from her optometrists office for bilateral papilledema. She was at the office for a routine glasses check. Reports a week long headache that is bifrontal R>L non radiating, pounding with photophobia that started suddenly 7 days prior. Reports being on acne medication and depo-provera. Denies visual disturbance, loss of vision or trauma. She reports no fevers, chills, night sweats or unexpected weight loss. BMI 41, non smoker. On the admission x2 days ago she had an LP completed by IR with 10 cc taken off. opening pressure 28, closing pressure 18. Started on diamox 250 mg BID and was encouraged to d/c retinoid medications.        Returns today with worsening headaches, bifrontal, non radiating pressure and 9/10. Severe nausea with emesis. Has had x2 episodes of emesis. Denies visual changes, weakness, incontinence, or speech changes. Endorses some mild tingling in the fingertips and toes bilat.        PREVIOUS WORKUP:     Lab Results   Component Value Date    WBC 6.3 02/23/2021    HGB 13.9 02/23/2021    HCT 41.9 02/23/2021    MCV 86.9 02/23/2021     02/23/2021       Past Medical History:   Diagnosis Date    Cholecystitis     gallstones        Past Surgical History:   Procedure Laterality Date    CHOLECYSTECTOMY, LAPAROSCOPIC  09/22/2017    CHOLECYSTECTOMY, LAPAROSCOPIC N/A 9/22/2017    CHOLECYSTECTOMY LAPAROSCOPIC, POSSIBLE COMMON BILE DUCT EXPLORATION, POSSIBLE IOC, **SHORT STAY** performed by Jazzy Waldrop MD at [address] [place] [address] History     Socioeconomic History    Marital status: Single     Spouse name: Not on file    Number of children: Not on file    Years of education: Not on file    Highest education level: Not on file   Occupational History    Not on file   Tobacco Use    Smoking status: Never Smoker    Smokeless tobacco: Never Used   Substance and Sexual Activity    Alcohol use: Yes    Drug use: No    Sexual activity: Yes     Birth control/protection: Injection   Other Topics Concern    Not on file   Social History Narrative    She is a senior in high school. She hopes to attend college next year. She plays the [employer] in band. Social Determinants of Health     Financial Resource Strain:     Difficulty of Paying Living Expenses:    Food Insecurity:     Worried About Running Out of Food in the Last Year:     [address] in the Last Year:    Transportation Needs:     Lack of Transportation (Medical):      Lack of Transportation (Non-Medical):    Physical Activity:     Days of Exercise per Week:     Minutes of Exercise per Session:    Stress:     Feeling of Stress :    Social Connections:     Frequency of Communication with Friends and Family:     Frequency of Social Gatherings with Friends and Family:     Attends [employer] Services:     Active Member of Clubs or Organizations:     Attends Club or Organization Meetings:     Marital Status:    Intimate Partner Violence:     Fear of Current or Ex-Partner:     Emotionally Abused:     Physically Abused:     Sexually Abused:         Current Outpatient Medications   Medication Sig Dispense Refill    acetaZOLAMIDE (DIAMOX) 250 MG tablet Take 1 tablet by mouth 2 times daily 90 tablet 3    medroxyPROGESTERone (DEPO-PROVERA) 150 MG/ML injection 1 mL      omeprazole (PRILOSEC) 10 MG delayed release capsule Take 10 mg by mouth daily (Patient not taking: Reported on 7/29/2021)      desmopressin (DDAVP) 0.1 MG tablet Take 0.1 mg by mouth 2 times daily (Patient not taking: Reported on 7/29/2021)       No current facility-administered medications for this visit. No Known Allergies     REVIEW OF SYSTEMS:     Review of Systems   Constitutional: Negative for activity change, appetite change, chills, diaphoresis, fatigue and fever. HENT: Negative for sore throat. Eyes: Negative for discharge and redness. Respiratory: Negative for apnea, cough, chest tightness, shortness of breath, wheezing and stridor. Cardiovascular: Negative for chest pain and leg swelling. Gastrointestinal: Negative for abdominal distention, abdominal pain, constipation, diarrhea, nausea and vomiting. Genitourinary: Negative for difficulty urinating, dysuria, flank pain, frequency, hematuria and urgency. Musculoskeletal: Negative for arthralgias. Neurological: Negative for dizziness, tremors, seizures, syncope, facial asymmetry, speech difficulty, weakness, light-headedness, numbness and headaches. Hematological: Negative for adenopathy. VITALS  /82   Pulse 94   Ht 5' 6\" (1.676 m)   Wt 247 lb (112 kg)   SpO2 97%   BMI 39.87 kg/m²      PHYSICAL EXAMINATION:     Physical Exam  Vitals and nursing note reviewed. Constitutional:       General: She is not in acute distress. Appearance: She is well-developed. She is not diaphoretic. HENT:      Head: Normocephalic and atraumatic. Right Ear: External ear normal.      Left Ear: External ear normal.   Eyes:      General: No scleral icterus. Right eye: No discharge. Left eye: No discharge. Conjunctiva/sclera: Conjunctivae normal.      Pupils: Pupils are equal, round, and reactive to light. Funduscopic exam:     Right eye: No papilledema. Left eye: No papilledema. Pulmonary:      Effort: Pulmonary effort is normal.   Abdominal:      General: There is no distension. Palpations: Abdomen is soft. Tenderness: There is no abdominal tenderness. There is no guarding.    Musculoskeletal: General: No tenderness or deformity. Normal range of motion. Cervical back: Normal range of motion. Skin:     General: Skin is warm and dry. Capillary Refill: Capillary refill takes less than 2 seconds. Findings: No erythema or rash. Neurological:      Mental Status: She is alert and oriented to person, place, and time. GCS: GCS eye subscore is 4. GCS verbal subscore is 5. GCS motor subscore is 6. Cranial Nerves: No cranial nerve deficit. Sensory: No sensory deficit. Motor: No abnormal muscle tone or seizure activity. Coordination: Coordination normal.      Deep Tendon Reflexes: Reflexes are normal and symmetric. Reflexes normal.      Reflex Scores:       Tricep reflexes are 2+ on the right side and 2+ on the left side. Bicep reflexes are 2+ on the right side and 2+ on the left side. Brachioradialis reflexes are 2+ on the right side and 2+ on the left side. Patellar reflexes are 2+ on the right side and 2+ on the left side. Achilles reflexes are 2+ on the right side and 2+ on the left side.   Psychiatric:         Behavior: Behavior normal.        NEUROLOGICAL EXAMINATION:     GENERAL  Appears comfortable and in no distress   HEENT  NC/ AT   HEART  S1 and S2 heard; palpation of pulses: radial pulse    NECK  Supple and no bruits heard   MENTAL STATUS:  Alert, oriented, intact memory, no confusion, normal speech, normal language, no hallucination or delusion   CRANIAL NERVES: II     -      Visual fields intact to confrontation  III,IV,VI -  PERR, EOMs full, no ptosis  V     -     Normal facial sensation   VII    -     Normal facial symmetry  VIII   -     Intact hearing   IX,X -     Symmetrical palate  XI    -     Symmetrical shoulder shrug  XII   -     Midline tongue, no atrophy    MOTOR FUNCTION: RUE: Significant for good strength of grade 5/5 in proximal and distal muscle groups   LUE: Significant for good strength of grade 5/5 in proximal and distal muscle groups   RLE: Significant for good strength of grade 5/5 in proximal and distal muscle groups   LLE: Significant for good strength of grade 5/5 in proximal and distal muscle groups      Normal bulk, normal tone and no involuntary movements, no tremor   SENSORY FUNCTION:  Normal touch, normal pin, normal vibration, normal proprioception   CEREBELLAR FUNCTION:  Intact fine motor control over upper limbs and lower limbs   REFLEX FUNCTION:  Symmetric in upper and lower extremities, no Babinski sign   STATION and GAIT  Normal gait and tandem station, normal tip toes and heel walking     IMAGING:     IR LUMBAR PUNCTURE FOR DIAGNOSIS    Result Date: 7/27/2021  Successful fluoroscopic-guided lumbar puncture. MRV HEAD W WO CONTRAST    Result Date: 7/27/2021  Unremarkable MRV of the head. MRI BRAIN W WO CONTRAST    Result Date: 7/27/2021  1. Flattening of the posterior globes at the optic nerve insertions compatible with provided history of papilledema. 2. No intracranial mass, acute infarction, hydrocephalus, or midline shift. ASSESSMENT:       Case of a 22 yo F referred to the ED by her optometrist for papilledema during a routine eye exam. Bifrontal R>L bifrontal headaches ongoing for 1 week suspicious for IIH. Non focal exam.        PLAN:       1. She needs to go to the ED to get IVF, IV caffeine and may need a blood patch  2. Continue Diamox 250 Mg BID, will consider increasing to 500 mg BID on follow up  3. F/u with Dr. Derk Felty neuro op  4. RTC 1 month    Ms. Wilkinson received counseling on the following healthy behaviors: medical compliance, smoking cessation, blood pressure control, regular follow up with primary doctor.         Electronically signed by       Ney Aguillon MD, MELITA  PGY-4 Neurology   7/29/2021 at 5:15 PM

## 2021-07-29 NOTE — TELEPHONE ENCOUNTER
Mother called states Dr. Nadeen Lefort a resident at neurology referred patient to ED at [facility] due to patient having difficulties since having a lumbar puncture done a few days ago. Mother informed that this information will be documented however, it's up to MMO to decide about the copay. Reason for Disposition  Luan Laguna Caller has already spoken with another triager and has no further questions.     Protocols used: NO CONTACT OR DUPLICATE CONTACT CALL-ADULT-

## 2021-07-29 NOTE — ED PROVIDER NOTES
[facility]  Emergency Department  Faculty Attestation     I performed a history and physical examination of the patient and discussed management with the resident. I reviewed the residents note and agree with the documented findings and plan of care. Any areas of disagreement are noted on the chart. I was personally present for the key portions of any procedures. I have documented in the chart those procedures where I was not present during the key portions. I have reviewed the emergency nurses triage note. I agree with the chief complaint, past medical history, past surgical history, allergies, medications, social and family history as documented unless otherwise noted below. For Physician Assistant/ Nurse Practitioner cases/documentation I have personally evaluated this patient and have completed at least one if not all key elements of the E/M (history, physical exam, and MDM). Additional findings are as noted. Primary Care Physician:  Shell Oviedo MD    Screenings:  [address]       Chief Complaint   Patient presents with    Migraine     had an LP here on tuesday, headache since and vomiting 4x today. went to neurologist today and was told to come here       RECENT VITALS:   Temp: 97.8 °F (36.6 °C),  Pulse: 95, Resp: 16, BP: 132/84    LABS:  Labs Reviewed - No data to display    Radiology  No orders to display         Attending Physician Additional  Notes    Patient is 2 days status post lumbar puncture for idiopathic intracranial hypertension. She had previously been on acne medication which has been discontinued. She was sent by her ophthalmologist for papilledema. MRI showed concern for increased intracranial pressure based on flattening of the insertion of the optic nerve. Interventional radiology did lumbar puncture and had opening pressure of 28 and closing pressure of 18. She is now on Diamox by prescription.   Since the lumbar puncture she has been having worsening headache than she had previously much worse in the upright position. Pain is 6/10 in intensity when lying flat up to 10/10 intensity sitting and standing. There is no stiff neck. No fever. She has tingling in the tips of her fingers as well as slightly in the toes. No other strokelike symptoms. No visual changes change in speech or gait. No stiff neck. She has been drinking caffeine without improvement of the headache but resulting in several episodes of vomiting. On exam she is uncomfortable afebrile vital signs are minimal hypertension otherwise normal.  GCS is 15. Normal pupils extraocular meds no nystagmus. Cranial nerves intact. Normal motor strength. Normal sensation light touch. Negative jolt maneuver, negative Kernig sign. Impression is initial idiopathic intracranial hypertension now intracranial hypotension status post lumbar puncture (another words post LP headache). Plan is IV fluids, migraine cocktail including steroids, reassess, consider IV caffeine, if no improvement will admit to observation for anesthesia consult for blood patch. Salvatore Yancey.  Allie Collins MD, [facility]  Attending Emergency  Physician               Lam Lund MD  07/29/21 2032

## 2021-07-29 NOTE — PROGRESS NOTES
Attending Physician Statement:    I have discussed the case of Tila Nicole, including pertinent history and exam findings with the resident. I have seen and examined the patient and the key elements of the encounter have been performed by me. I have reviewed medications, clinical laboratory, imaging and other diagnostic tests with the residents. I agree with the assessment, plan and orders as documented by the resident with changes made to the note as needed. History:  Ms. Tila Nicole is a 23 y.o. female was seen in the clinic for post hospital follow-up. Patient was recently seen in the ER on 7/27/2021 for papilledema. She initially saw her optometrist for regular eye checkup, was diagnosed to have bilateral papilledema and hence was referred to the ER for further management. She endorses headaches, bifrontal in location, worse on the right side as compared to the left, nonradiating, pounding in nature associated with photophobia that started around 7 days prior to presentation. She was taking acne medication/retinal along with Depo-Provera. Denies any visual disturbances or loss of vision. Denies any positional correlation of the headache. Her BMI was 41. She had MRI brain done with and without contrast 7/27/2021 which showed flattening in the posterior globes at the optic nerves insertions compatible with papilledema. No intracranial mass or hydrocephalus or midline shift noted. MRV head with and without contrast 7/27/2021 was unremarkable. Patient had lumbar puncture done through IR it showed opening pressure of 28 cm of water, 10 mL of fluid was removed, closing pressure was 19 cm of water. Patient was started on Diamox 250 mg twice a day and instructed to follow-up in neurology clinic.     Since discharge from the ER, patient endorses worsening of her headaches, it is mainly bifrontal in location, 9/10 intensity, persistent, she tried Tylenol, denies any improvement in the headaches with Tylenol. Patient endorses worsening of her symptoms on sitting up, endorses headaches getting better on laying down. Patient noted to be more lethargic and sleepy. Endorses nausea and vomiting. Denies any worsening of her vision. Denies any blurry vision or loss of vision. Denies any weakness in bilateral upper or lower extremities. Denies any speech difficulty. Denies any dizziness or drowsiness or lightheadedness or vertigo. Impression and Plan:     Possible post dural puncture headache  Pseudotumor cerebri/idiopathic intracranial hypertension. Patient presented with bilateral papilledema and headaches  LP with opening pressure of 28 cm of water on 2/27/2021. Patient's BMI was 41  MRI brain done with and without contrast 7/27/2021 showed flattening in the posterior globes at the optic nerves insertions compatible with papilledema. No intracranial mass or hydrocephalus or midline shift noted. MRV head with and without contrast 7/27/2021 was unremarkable. Plan    -As patient has significant worsening of her headaches since the ER visit, patient was instructed to go to the ER to get evaluated there is a concern for possible post LP headache, she may need some IV fluid and IV caffeine, if her headaches still does not improve may need a blood patch. Patient will also need Benadryl and Phenergan for nausea vomiting.    -Continue Diamox 250 mg twice a day for now, may consider increasing to 500 mg twice a day as per patient's response during next clinic visit.  -Instructed patient to follow-up with neuro-ophthalmologist for further evaluation and management of bilateral papilledema  -Discussed with patient to stop taking retinol medications for her acne she can try some other medications for the acne. - Follow up in the clinic with the resident in 3 months  - Instructed patient to call the clinic if symptoms worsen or develop any new symptoms.      This note is created with the assistance of a speech recognition program.  While intending to generate a document that actually reflects the content of the visit, the document can still have some errors including those of syntax and sound a like substitutions which may escape proof reading. In such instances, actual meaning can be extrapolated by contextual diversion.     Heladio Justice MD 7/29/2021 4:41 PM    Neurology

## 2021-07-29 NOTE — CARE COORDINATION
Ambulatory Care Coordination Note  7/29/2021  CM Risk Score: 0  Charlson 10 Year Mortality Risk Score: 2%     ACC: Kerby Lennox, RN    Summary Note: ACM attempted to reach patient for introduction to Associate Care Management related to ED visit for Papilledema and idiopathic intracranial hypertension. HIPAA compliant message left requesting a return phone call. Will attempt to outreach patient again.            Future Appointments   Date Time Provider Re Kelley   8/10/2021  4:15 PM ADRIÁN Palomino [address] [id number]   10/7/2021  4:00 PM Danielle Walsh MD Neuro  [place]

## 2021-07-30 VITALS
DIASTOLIC BLOOD PRESSURE: 66 MMHG | OXYGEN SATURATION: 95 % | HEART RATE: 72 BPM | HEIGHT: 66 IN | RESPIRATION RATE: 17 BRPM | WEIGHT: 245 LBS | SYSTOLIC BLOOD PRESSURE: 132 MMHG | TEMPERATURE: 98.1 F | BODY MASS INDEX: 39.37 KG/M2

## 2021-07-30 PROBLEM — G93.2 IIH (IDIOPATHIC INTRACRANIAL HYPERTENSION): Status: ACTIVE | Noted: 2021-07-30

## 2021-07-30 LAB
ABSOLUTE EOS #: <0.03 K/UL (ref 0–0.44)
ABSOLUTE IMMATURE GRANULOCYTE: 0.03 K/UL (ref 0–0.3)
ABSOLUTE LYMPH #: 1.14 K/UL (ref 1.1–3.7)
ABSOLUTE MONO #: 0.29 K/UL (ref 0.1–1.4)
ANION GAP SERPL CALCULATED.3IONS-SCNC: 13 MMOL/L (ref 9–17)
BASOPHILS # BLD: 0 % (ref 0–2)
BASOPHILS ABSOLUTE: <0.03 K/UL (ref 0–0.2)
BUN BLDV-MCNC: 12 MG/DL (ref 6–20)
BUN/CREAT BLD: ABNORMAL (ref 9–20)
CALCIUM SERPL-MCNC: 8.7 MG/DL (ref 8.6–10.4)
CHLORIDE BLD-SCNC: 109 MMOL/L (ref 98–107)
CO2: 14 MMOL/L (ref 20–31)
CREAT SERPL-MCNC: 0.61 MG/DL (ref 0.5–0.9)
CULTURE: NORMAL
DIFFERENTIAL TYPE: ABNORMAL
DIRECT EXAM: NORMAL
EOSINOPHILS RELATIVE PERCENT: 0 % (ref 1–4)
GFR AFRICAN AMERICAN: >60 ML/MIN
GFR NON-AFRICAN AMERICAN: >60 ML/MIN
GFR SERPL CREATININE-BSD FRML MDRD: ABNORMAL ML/MIN/{1.73_M2}
GFR SERPL CREATININE-BSD FRML MDRD: ABNORMAL ML/MIN/{1.73_M2}
GLUCOSE BLD-MCNC: 191 MG/DL (ref 70–99)
HCT VFR BLD CALC: 43.1 % (ref 36.3–47.1)
HEMOGLOBIN: 13.4 G/DL (ref 11.9–15.1)
IMMATURE GRANULOCYTES: 0 %
LYMPHOCYTES # BLD: 12 % (ref 25–45)
Lab: NORMAL
MCH RBC QN AUTO: 28.4 PG (ref 25.2–33.5)
MCHC RBC AUTO-ENTMCNC: 31.1 G/DL (ref 28.4–34.8)
MCV RBC AUTO: 91.3 FL (ref 82.6–102.9)
MONOCYTES # BLD: 3 % (ref 2–8)
NRBC AUTOMATED: 0 PER 100 WBC
PDW BLD-RTO: 11.9 % (ref 11.8–14.4)
PLATELET # BLD: 321 K/UL (ref 138–453)
PLATELET ESTIMATE: ABNORMAL
PMV BLD AUTO: 9.8 FL (ref 8.1–13.5)
POTASSIUM SERPL-SCNC: 3.8 MMOL/L (ref 3.7–5.3)
RBC # BLD: 4.72 M/UL (ref 3.95–5.11)
RBC # BLD: ABNORMAL 10*6/UL
SEG NEUTROPHILS: 85 % (ref 34–64)
SEGMENTED NEUTROPHILS ABSOLUTE COUNT: 7.73 K/UL (ref 1.5–8.1)
SODIUM BLD-SCNC: 136 MMOL/L (ref 135–144)
SPECIMEN DESCRIPTION: NORMAL
WBC # BLD: 9.2 K/UL (ref 4.5–13.5)
WBC # BLD: ABNORMAL 10*3/UL

## 2021-07-30 PROCEDURE — 6370000000 HC RX 637 (ALT 250 FOR IP): Performed by: STUDENT IN AN ORGANIZED HEALTH CARE EDUCATION/TRAINING PROGRAM

## 2021-07-30 PROCEDURE — 80048 BASIC METABOLIC PNL TOTAL CA: CPT

## 2021-07-30 PROCEDURE — 85025 COMPLETE CBC W/AUTO DIFF WBC: CPT

## 2021-07-30 PROCEDURE — 6360000002 HC RX W HCPCS: Performed by: STUDENT IN AN ORGANIZED HEALTH CARE EDUCATION/TRAINING PROGRAM

## 2021-07-30 PROCEDURE — 2580000003 HC RX 258: Performed by: STUDENT IN AN ORGANIZED HEALTH CARE EDUCATION/TRAINING PROGRAM

## 2021-07-30 PROCEDURE — 99222 1ST HOSP IP/OBS MODERATE 55: CPT | Performed by: PSYCHIATRY & NEUROLOGY

## 2021-07-30 PROCEDURE — 36415 COLL VENOUS BLD VENIPUNCTURE: CPT

## 2021-07-30 PROCEDURE — 2500000003 HC RX 250 WO HCPCS: Performed by: STUDENT IN AN ORGANIZED HEALTH CARE EDUCATION/TRAINING PROGRAM

## 2021-07-30 RX ORDER — ACETAMINOPHEN 650 MG/1
650 SUPPOSITORY RECTAL EVERY 6 HOURS PRN
Status: DISCONTINUED | OUTPATIENT
Start: 2021-07-30 | End: 2021-07-30 | Stop reason: HOSPADM

## 2021-07-30 RX ORDER — MAGNESIUM SULFATE IN WATER 40 MG/ML
2000 INJECTION, SOLUTION INTRAVENOUS PRN
Status: DISCONTINUED | OUTPATIENT
Start: 2021-07-30 | End: 2021-07-30 | Stop reason: HOSPADM

## 2021-07-30 RX ORDER — PROCHLORPERAZINE EDISYLATE 5 MG/ML
10 INJECTION INTRAMUSCULAR; INTRAVENOUS EVERY 8 HOURS PRN
Status: DISCONTINUED | OUTPATIENT
Start: 2021-07-30 | End: 2021-07-30 | Stop reason: HOSPADM

## 2021-07-30 RX ORDER — ACETAMINOPHEN 325 MG/1
650 TABLET ORAL EVERY 6 HOURS PRN
Status: DISCONTINUED | OUTPATIENT
Start: 2021-07-30 | End: 2021-07-30 | Stop reason: HOSPADM

## 2021-07-30 RX ORDER — POTASSIUM CHLORIDE 7.45 MG/ML
10 INJECTION INTRAVENOUS PRN
Status: DISCONTINUED | OUTPATIENT
Start: 2021-07-30 | End: 2021-07-30 | Stop reason: HOSPADM

## 2021-07-30 RX ORDER — KETOROLAC TROMETHAMINE 30 MG/ML
30 INJECTION, SOLUTION INTRAMUSCULAR; INTRAVENOUS EVERY 8 HOURS PRN
Status: DISCONTINUED | OUTPATIENT
Start: 2021-07-30 | End: 2021-07-30 | Stop reason: HOSPADM

## 2021-07-30 RX ORDER — BUTALBITAL, ACETAMINOPHEN AND CAFFEINE 50; 325; 40 MG/1; MG/1; MG/1
1 TABLET ORAL EVERY 6 HOURS PRN
Qty: 30 TABLET | Refills: 0 | Status: SHIPPED | OUTPATIENT
Start: 2021-07-30

## 2021-07-30 RX ORDER — ONDANSETRON 4 MG/1
4 TABLET, ORALLY DISINTEGRATING ORAL EVERY 8 HOURS PRN
Status: DISCONTINUED | OUTPATIENT
Start: 2021-07-30 | End: 2021-07-30 | Stop reason: HOSPADM

## 2021-07-30 RX ORDER — DIPHENHYDRAMINE HYDROCHLORIDE 50 MG/ML
25 INJECTION INTRAMUSCULAR; INTRAVENOUS EVERY 8 HOURS PRN
Status: DISCONTINUED | OUTPATIENT
Start: 2021-07-30 | End: 2021-07-30 | Stop reason: HOSPADM

## 2021-07-30 RX ORDER — POTASSIUM CHLORIDE 20 MEQ/1
40 TABLET, EXTENDED RELEASE ORAL PRN
Status: DISCONTINUED | OUTPATIENT
Start: 2021-07-30 | End: 2021-07-30 | Stop reason: HOSPADM

## 2021-07-30 RX ORDER — ACETAZOLAMIDE 250 MG/1
250 TABLET ORAL 2 TIMES DAILY
Status: DISCONTINUED | OUTPATIENT
Start: 2021-07-30 | End: 2021-07-30 | Stop reason: HOSPADM

## 2021-07-30 RX ORDER — POLYETHYLENE GLYCOL 3350 17 G/17G
17 POWDER, FOR SOLUTION ORAL DAILY PRN
Status: DISCONTINUED | OUTPATIENT
Start: 2021-07-30 | End: 2021-07-30 | Stop reason: HOSPADM

## 2021-07-30 RX ORDER — ONDANSETRON 2 MG/ML
4 INJECTION INTRAMUSCULAR; INTRAVENOUS EVERY 6 HOURS PRN
Status: DISCONTINUED | OUTPATIENT
Start: 2021-07-30 | End: 2021-07-30 | Stop reason: HOSPADM

## 2021-07-30 RX ORDER — SODIUM CHLORIDE 9 MG/ML
25 INJECTION, SOLUTION INTRAVENOUS PRN
Status: DISCONTINUED | OUTPATIENT
Start: 2021-07-30 | End: 2021-07-30 | Stop reason: HOSPADM

## 2021-07-30 RX ADMIN — ENOXAPARIN SODIUM 40 MG: 40 INJECTION SUBCUTANEOUS at 08:23

## 2021-07-30 RX ADMIN — ACETAZOLAMIDE 250 MG: 250 TABLET ORAL at 09:36

## 2021-07-30 RX ADMIN — CAFFEINE AND SODIUM BENZOATE 500 MG: 125 INJECTION, SOLUTION INTRAMUSCULAR; INTRAVENOUS at 08:23

## 2021-07-30 ASSESSMENT — ENCOUNTER SYMPTOMS
VOMITING: 0
CONSTIPATION: 0
CHEST TIGHTNESS: 0
DIARRHEA: 0
SHORTNESS OF BREATH: 0
ABDOMINAL PAIN: 0
COUGH: 0
ABDOMINAL DISTENTION: 0
APNEA: 0

## 2021-07-30 ASSESSMENT — PAIN SCALES - GENERAL
PAINLEVEL_OUTOF10: 0
PAINLEVEL_OUTOF10: 0

## 2021-07-30 NOTE — H&P
[facility]   [address]    HISTORY AND PHYSICAL EXAMINATION            Date:   7/30/2021  Patient name:  Rena Maurice  Date of admission:  7/29/2021  7:29 PM  MRN:   [id number]  Account:  [de-identified]  YOB: 2000  PCP:    Jonathan Abel MD  Room:   07/07  Code Status:    Prior    Chief Complaint:     Chief Complaint   Patient presents with    Migraine     had an LP here on tuesday, headache since and vomiting 4x today. went to neurologist today and was told to come here       History Obtained From:     patient, mother    History of Present Illness: The patient is a 23 y.o. Non- / non  female who presents with Migraine (had an LP here on tuesday, headache since and vomiting 4x today. went to neurologist today and was told to come here)   and she is admitted to the hospital for the management of moderate to severe post LP headache. Patient states she had been having ongoing headache for about a week, just localized to the right forehead, 6-7/10 in intensity, had an appointment with her optometrist who noticed some papilledema and recommended that she go to the ED for further evaluation. Patient was seen in the ED on 7/27/2021, MRI brain at the time showed flattening of the posterior globes at the optic nerve insertions compatible with prior history of papilledema. There was concern for idiopathic intracranial hypertension, IR guided LP was ordered which showed an opening pressure of 28 cmH2O, 10 cc of CSF were drained and a closing pressure was 19 cm H2O. Patient was started on Diamox 50 mg twice daily and was discharged with outpatient neurology follow-up. Patient states she had a couple of good days with significant improvement in the headache but this morning started experiencing severe headache associated nausea and vomiting again.   7/10 in intensity, still localized to the right forehead, 4-5 episodes of vomiting since this morning. Patient went to see the neurologist this morning, was evaluated in clinic by Dr. Nubia Cunningham and Dr. Almond Siemens and advised to go to the ED for evaluation. MRI brain with and without contrast 7/27/2021: Flattening of the posterior globes at the optic nerve insertions compatible with provided history of papilledema. Intracranial mass, acute infarction, hydrocephalus or midline shift. MRV head with and without contrast 7/27/2021: Unremarkable MRV of the head. Past Medical History:     Past Medical History:   Diagnosis Date    Cholecystitis     gallstones        Past Surgical History:     Past Surgical History:   Procedure Laterality Date    CHOLECYSTECTOMY, LAPAROSCOPIC  09/22/2017    CHOLECYSTECTOMY, LAPAROSCOPIC N/A 9/22/2017    CHOLECYSTECTOMY LAPAROSCOPIC, POSSIBLE COMMON BILE DUCT EXPLORATION, POSSIBLE IOC, **SHORT STAY** performed by Jazzy Waldrop MD at [address]          Medications Prior to Admission:     Prior to Admission medications    Medication Sig Start Date End Date Taking? Authorizing Provider   acetaZOLAMIDE (DIAMOX) 250 MG tablet Take 1 tablet by mouth 2 times daily 7/27/21   Iain Pham MD   medroxyPROGESTERone (DEPO-PROVERA) 150 MG/ML injection 1 mL    Historical Provider, MD   omeprazole (PRILOSEC) 10 MG delayed release capsule Take 10 mg by mouth daily  Patient not taking: Reported on 7/29/2021    Historical Provider, MD   desmopressin (DDAVP) 0.1 MG tablet Take 0.1 mg by mouth 2 times daily  Patient not taking: Reported on 7/29/2021    Historical Provider, MD        Allergies:     Patient has no known allergies. Social History:     Tobacco:    reports that she has never smoked. She has never used smokeless tobacco.  Alcohol:      reports current alcohol use. Drug Use:  reports no history of drug use.     Family History:     Family History   Problem Relation Age of Onset    Other Maternal Grandmother         gallbladder disease    Other Paternal Grandmother         gallbladder disease    Bleeding Prob Neg Hx     Seizures Neg Hx     Anesth Problems Neg Hx        Review of Systems:     Review of Systems   Constitutional: Negative for chills, diaphoresis and fever. Respiratory: Negative for apnea, cough, chest tightness and shortness of breath. Cardiovascular: Negative for chest pain and palpitations. Gastrointestinal: Negative for abdominal distention, abdominal pain, constipation, diarrhea and vomiting. Genitourinary: Negative for flank pain. Musculoskeletal: Negative for neck pain and neck stiffness. Neurological: Positive for headaches. Negative for dizziness and light-headedness. Physical Exam:   /84   Pulse 95   Temp 97.8 °F (36.6 °C) (Oral)   Resp 16   Ht 5' 6\" (1.676 m)   Wt 245 lb (111.1 kg)   SpO2 98%   BMI 39.54 kg/m²   Temp (24hrs), Av.8 °F (36.6 °C), Min:97.8 °F (36.6 °C), Max:97.8 °F (36.6 °C)    No results for input(s): POCGLU in the last 72 hours. No intake or output data in the 24 hours ending 21 0053      Neurologic Exam     Mental Status   Oriented to person, place, and time. Attention: normal. Concentration: normal.   Speech: speech is normal   Level of consciousness: alert    Cranial Nerves   Cranial nerves II through XII intact. Motor Exam   Muscle bulk: normal  Overall muscle tone: normal  Right arm tone: normal  Left arm tone: normal  Right arm pronator drift: absent  Left arm pronator drift: absent  Right leg tone: normal  Left leg tone: normal    Strength   Strength 5/5 throughout.      Sensory Exam   Light touch normal.     Gait, Coordination, and Reflexes     Gait  Gait: (Not tested.)    Coordination   Finger to nose coordination: normal  Heel to shin coordination: normal    Tremor   Resting tremor: absent  Intention tremor: absent  Action tremor: absent    Reflexes   Right brachioradialis: 2+  Left brachioradialis: 2+  Right biceps: 2+  Left biceps: 2+  Right triceps: 2+  Left triceps: 2+  Right patellar: 2+  Left patellar: 2+  Right achilles: 2+  Left achilles: 2+  Right : 2+  Left : 2+  Right plantar: normal  Left plantar: normal        Investigations:      Laboratory Testing:  No results found for this or any previous visit (from the past 24 hour(s)). Assessment :      Primary Problem  IIH (idiopathic intracranial hypertension)    Active Hospital Problems    Diagnosis Date Noted    IIH (idiopathic intracranial hypertension) [G93.2] 07/30/2021    Headache [R51.9] 07/29/2021       Patient is a 23 y.o. female with recent diagnosis of idiopathic intracranial hypertension. Evaluated in the ED, LP performed by IR on 7/27/2021. Significant worsening of headache associated with N/V today. Sent to the ED from clinic, migraine cocktail given. 500 mg IV caffeine ordered. 1. Idiopathic intracranial hypertension  2. Post LP headache    MRI brain with and without contrast 7/27/2021: Flattening of the posterior globes at the optic nerve insertions compatible with provided history of papilledema. Intracranial mass, acute infarction, hydrocephalus or midline shift. MRV head with and without contrast 7/27/2021: Unremarkable MRV of the head. Plan:     Patient status Admit as inpatient in the  Progressive Unit/Step down    1. Prior imaging reviewed. 2. Migraine cocktail and Decadron 10 mg administered in the ED. 3. Continue Diamox 250 mg twice daily IV normal saline  4. IV normal saline 1000 cc bolus administered in the ED  5. IV caffeine 500 mg ordered  6. Benadryl 25 mg, Compazine 10 mg, Toradol 30 mg every 8 hourly as needed ordered  7. We will consider blood patch if headache persists  8. Neuro-ophthalmology evaluation as outpatient  9.  We will reevaluate in the morning for symptomatic improvement    Consultations:   IP CONSULT TO NEUROLOGY      Follow-up further recommendations after discussing the case with attending  The plan was discussed with the patient, patient's family and the medical staff. Patient is admitted as inpatient status because of co-morbidities listed above, severity of signs and symptoms as outlined, requirement for current medical therapies and most importantly because of direct risk to patient if care not provided in a hospital setting.     Zari Crawford MD  7/30/2021  12:53 AM    Copy sent to Dr. Laureen Martínez MD

## 2021-07-30 NOTE — ED NOTES
Pt denies any pain at this time, reports 0/10. Pt is resting in bed, RR even and unlabored, NAD noted. Personal items and call light within reach. Will con't to monitor.       Kush Greene RN  07/30/21 0839

## 2021-07-30 NOTE — DISCHARGE SUMMARY
Neurology Discharge Summary     Patient ID: Veena Gonzalez  :  2000   MRN: [id number]     ACCOUNT:  [de-identified]   Patient's PCP: Oli Jimenez MD  Admit Date: 2021   Discharge Date: 2021   Length of Stay: 1  Code Status:  Full Code  Admitting Physician: Laurent Bradford MD  Discharge Physician: Dipti Blanc MD     Active Discharge Diagnoses:       Primary Problem  IIH (idiopathic intracranial hypertension)      [facility] Problems    Diagnosis Date Noted    IIH (idiopathic intracranial hypertension) [G93.2] 2021    Post lumbar puncture headache [G97.1]     Migraine [G43.909]     Numbness and tingling [R20.0, R20.2]     Headache [R51.9] 2021       Condition on the discharge: good     Hospital Stay:       Hospital Course:  Veena Gonzalez is a 23 y.o. female who was admitted for the management of  IIH (idiopathic intracranial hypertension) , presented to ER with Migraine (had an LP here on tuesday, headache since and vomiting 4x today. went to neurologist today and was told to come here)  She was noted on 2021 with a recent diagnosis of pseudotumor cerebri. She had a spinal tap on 2021 because MRI prior showed flattening of the posterior globes at the optic nerve insertion. Opening pressure on the spinal tap was 28 cm H2O closing pressure was 17 cm H2O. after the spinal tap she had a moderate to severe headache which involved vomiting 4-5 times. She went to the clinic to be evaluated and was advised to go to the ED for evaluation of post spinal tap headache. She was started on IV fluids and caffeine benzoate. Her condition improved overnight and she her headache ranges from 0-1 out of 10. She has no deficits and no other complaints. Significant therapeutic interventions: Caffeine benzoate IV drip.     Significant Diagnostic Studies:   Labs / Micro:  BMP:    Lab Results   Component Value Date    GLUCOSE 191 2021    NA 136 07/30/2021    K 3.8 07/30/2021     07/30/2021    CO2 14 07/30/2021    ANIONGAP 13 07/30/2021    BUN 12 07/30/2021    CREATININE 0.61 07/30/2021    BUNCRER NOT REPORTED 07/30/2021    CALCIUM 8.7 07/30/2021    LABGLOM >60 07/30/2021    GFRAA >60 07/30/2021    GFR      07/30/2021    GFR NOT REPORTED 07/30/2021     ,     Radiology:    IR LUMBAR PUNCTURE FOR DIAGNOSIS    Result Date: 7/27/2021  EXAMINATION: FLUOROSCOPIC GUIDED LUMBAR PUNCTURE 7/27/2021 3:25 pm HISTORY: ORDERING SYSTEM PROVIDED HISTORY: [facility] TECHNOLOGIST PROVIDED HISTORY: Opening and closing pressures LP for DX Is the patient pregnant?->No Papilledema FLUOROSCOPY DOSE AND TYPE OR TIME AND EXPOSURES:  cGy cm 2 PROCEDURE: :  Stalin Barajas This procedure was performed by Fan Dee, under indirect supervision. The patient was placed prone on the fluoroscopic table. The lower back was prepped and draped in usual sterile fashion. 1% lidocaine was used as local anesthetic. Under fluoroscopic guidance a 20-gauge spinal needle was advanced to the thecal sac at the L3 L4 level. Upon establishing CSF fluid return, the measured opening pressure was 28 cm of water which is high. Multiple vials of clear CSF were obtained for a total of 10 mL. Closing pressure of 19 cm of water. The samples were labeled appropriately. Estimated blood loss was 1 mL. The needle was then removed and dressing was applied at the puncture site. The patient tolerated the procedure well and left the department in stable condition. Successful fluoroscopic-guided lumbar puncture. MRV HEAD W WO CONTRAST    Result Date: 7/27/2021  EXAMINATION: MRV OF THE HEAD WITH AND WITHOUT CONTRAST  7/27/2021: TECHNIQUE: Multiplanar multisequence MRV of the head was performed with and without the administration of intravenous contrast. COMPARISON: None.  HISTORY: ORDERING SYSTEM PROVIDED HISTORY: [facility] TECHNOLOGIST PROVIDED HISTORY: [facility] Decision Support Exception - unselect if not a suspected or confirmed emergency medical condition->Emergency Medical Condition (MA) Is the patient pregnant?->No Reason for Exam: IIH Additional signs and symptoms: headache behind right eye. pt states its getting better FINDINGS: The superior sagittal sinus, inferior sagittal sinus, internal cerebral veins, straight sinus, transverse sinuses, sigmoid sinuses, and upper jugular veins are patent without focal stenosis, thrombosis, or occlusion. Unremarkable MRV of the head. MRI BRAIN W WO CONTRAST    Result Date: 7/27/2021  EXAMINATION: MRI OF THE BRAIN WITHOUT AND WITH CONTRAST  7/27/2021 12:08 pm TECHNIQUE: Multiplanar multisequence MRI of the head/brain was performed without and with the administration of intravenous contrast. COMPARISON: None. HISTORY: ORDERING SYSTEM PROVIDED HISTORY: [facility] TECHNOLOGIST PROVIDED HISTORY: [facility] Decision Support Exception - unselect if not a suspected or confirmed emergency medical condition->Emergency Medical Condition (MA) Is the patient pregnant?->No Photophobia. Papilledema. FINDINGS: INTRACRANIAL STRUCTURES/VENTRICLES:  No acute infarction. No mass effect or midline shift. No acute intracranial hemorrhage. The ventricles and sulci are normal in size and configuration. The sellar/suprasellar regions appear unremarkable. The normal signal voids within the major intracranial vessels appear maintained. No abnormal focus of enhancement is seen within the brain. ORBITS: Flattening of the posterior globe at the optic nerve insertions, best appreciated on the post-contrast axial sequence (series 10, image 10). Otherwise, the orbits appear unremarkable. SINUSES: The visualized paranasal sinuses and mastoid air cells are well aerated. BONES/SOFT TISSUES: The bone marrow signal intensity appears normal. The soft tissues demonstrate no acute abnormality.      1. Flattening of the posterior globes at the optic nerve insertions compatible with provided history of papilledema. 2. No intracranial mass, acute infarction, hydrocephalus, or midline shift. Consultations:    Consults:     Final Specialist Recommendations/Findings:   IP CONSULT TO NEUROLOGY      The patient was seen and examined on day of discharge and this discharge summary is in conjunction with any daily progress note from day of discharge. Discharge plan:       Disposition: Home    Physician Follow Up: Lew Caballero MD  [address] [place] [address]  [phone]    Call         Requiring Further Evaluation/Follow Up POST HOSPITALIZATION/Incidental Findings: None    Diet: regular diet    Activity: As tolerated    Instructions to Patient:     Restrictions: Driving Yes, Swimming Yes, Operating heavy machinery Yes, Compromising heights Yes      Discharge Medications:      Medication List      START taking these medications    butalbital-acetaminophen-caffeine -40 MG per tablet  Commonly known as: FIORICET, ESGIC  Take 1 tablet by mouth every 6 hours as needed for Headaches        ASK your doctor about these medications    acetaZOLAMIDE 250 MG tablet  Commonly known as: DIAMOX  Take 1 tablet by mouth 2 times daily     Depo-Provera 150 MG/ML injection  Generic drug: medroxyPROGESTERone     desmopressin 0.1 MG tablet  Commonly known as: DDAVP     omeprazole 10 MG delayed release capsule  Commonly known as: PRILOSEC           Where to Get Your Medications      These medications were sent to [facility] [address] [facility], [address], [address] [facility] [address]    Phone: [phone]   · butalbital-acetaminophen-caffeine -40 MG per tablet         Time Spent on discharge is  40 mins in patient examination, evaluation, counseling as well as medication reconciliation, prescriptions for required medications, discharge plan and follow up.     Electronically signed by   Dimas Garcia MD  7/30/2021  4:06 PM      Thank you  Laureen Martínez MD for the opportunity to be involved in this patient's care.

## 2021-07-30 NOTE — ED PROVIDER NOTES
[facility]  ED  Emergency Department Encounter  EmergencyMedicine Resident     Pt Anne Malone  MRN: [id number]  [place] 2000  Date of evaluation: 7/29/21  PCP:  Jonathan Abel MD    [address] [place] [address]       Chief Complaint   Patient presents with    Migraine     had an LP here on tuesday, headache since and vomiting 4x today. went to neurologist today and was told to come here       HISTORY OF PRESENT ILLNESS  (Location/Symptom, Timing/Onset, Context/Setting, Quality, Duration, Modifying Factors, Severity.)      Rena Maurice is a 23 y.o. female who presents with headache that is sharp, and worse with standing. Patient had a history of papilledema and intracranial hypertension, she has been under the care of neurology, and saw the neurologist today who sent her here for concerns for a post LP headache. Patient initially was seen back on 7/27 after optometrist noticed papilledema, and during her encounter here she was sent to IR for LP. Her opening pressure was 28 and 10 cc of fluid was removed. She was started on Diamox and followed up in neurology clinic today. She states that her headache is worse now    PAST MEDICAL / SURGICAL / SOCIAL / FAMILY HISTORY      has a past medical history of Cholecystitis. Denies further past medical hx     has a past surgical history that includes Tympanostomy tube placement; Cholecystectomy, laparoscopic (09/22/2017); and Cholecystectomy, laparoscopic (N/A, 9/22/2017). Denies further past surgical hx    Social History     Socioeconomic History    Marital status: Single     Spouse name: Not on file    Number of children: Not on file    Years of education: Not on file    Highest education level: Not on file   Occupational History    Not on file   Tobacco Use    Smoking status: Never Smoker    Smokeless tobacco: Never Used   Substance and Sexual Activity    Alcohol use:  Yes    Drug use: No    Sexual activity: Yes     Birth control/protection: Injection   Other Topics Concern    Not on file   Social History Narrative    She is a senior in high school. She hopes to attend college next year. She plays the [employer] in band. Social Determinants of Health     Financial Resource Strain:     Difficulty of Paying Living Expenses:    Food Insecurity:     Worried About Running Out of Food in the Last Year:     [address] in the Last Year:    Transportation Needs:     Lack of Transportation (Medical):  Lack of Transportation (Non-Medical):    Physical Activity:     Days of Exercise per Week:     Minutes of Exercise per Session:    Stress:     Feeling of Stress :    Social Connections:     Frequency of Communication with Friends and Family:     Frequency of Social Gatherings with Friends and Family:     Attends Orthodoxy Services:     Active Member of Clubs or Organizations:     Attends Club or Organization Meetings:     Marital Status:    Intimate Partner Violence:     Fear of Current or Ex-Partner:     Emotionally Abused:     Physically Abused:     Sexually Abused:        Family History   Problem Relation Age of Onset    Other Maternal Grandmother         gallbladder disease    Other Paternal Grandmother         gallbladder disease    Bleeding Prob Neg Hx     Seizures Neg Hx     Anesth Problems Neg Hx        Allergies:  Patient has no known allergies. Home Medications:  Prior to Admission medications    Medication Sig Start Date End Date Taking?  Authorizing Provider   acetaZOLAMIDE (DIAMOX) 250 MG tablet Take 1 tablet by mouth 2 times daily 7/27/21   Manuel Schmidt MD   medroxyPROGESTERone (DEPO-PROVERA) 150 MG/ML injection 1 mL    Historical Provider, MD   omeprazole (PRILOSEC) 10 MG delayed release capsule Take 10 mg by mouth daily  Patient not taking: Reported on 7/29/2021    Historical Provider, MD   desmopressin (DDAVP) 0.1 MG tablet Take 0.1 mg by mouth 2 times daily  Patient not taking: Reported on 7/29/2021    Historical Provider, MD       REVIEW OF SYSTEMS    (2-9 systems for level 4, 10 or more for level 5)      Review of Systems    Review of Systems   Constitutional: Positive for nausea  HENT: Negative for sore throat. Eyes: Positive for photophobia  Respiratory: Negative for cough. Cardiovascular: Negative for chest pain and palpitations. Gastrointestinal: Negative for abdominal pain, nausea and vomiting. Genitourinary: Negative for dysuria. Musculoskeletal: Negative for gait problem. Skin: Negative for wound. Neurological: Positive for headache      PHYSICAL EXAM   (up to 7 for level 4, 8 or more for level 5)      INITIAL VITALS:   /84   Pulse 95   Temp 97.8 °F (36.6 °C) (Oral)   Resp 16   Ht 5' 6\" (1.676 m)   Wt 245 lb (111.1 kg)   SpO2 98%   BMI 39.54 kg/m²     Physical Exam   Gen. Appearance: patient appears well, nondistressed. Head: head atraumatic, normocephalic. Eyes: Extraocular movements intact. Pupils equal and reactive bilaterally. Photophobia with exam.  No injection  Mouth: Oropharynx clear and moist.  No oral lesions  Neck: Supple. No lymphadenopathy. Pulmonary: Lungs clear to auscultation bilaterally. No wheezing, rales or rhonchi   Cardiovascular: Regular rate and rhythm, no murmurs   Abdomen: Soft, nontender, no guarding or rebound, normal bowel sounds  Neurology: GCS 15. Oriented to person place and time. moving all extremities. Cranial nerves II through XII grossly intact. No focal deficits in upper or lower extremities.    Skin: Warm, dry, well perfused        DIFFERENTIAL  DIAGNOSIS     PLAN (LABS / IMAGING / EKG):  Orders Placed This Encounter   Procedures    Inpatient consult to Neurology    PATIENT STATUS (FROM ED OR OR/PROCEDURAL) Inpatient       MEDICATIONS ORDERED:  Orders Placed This Encounter   Medications    dexamethasone (DECADRON) injection 10 mg    diphenhydrAMINE (BENADRYL) injection 50 mg    prochlorperazine (COMPAZINE) injection 10 mg    ketorolac (TORADOL) injection 30 mg    0.9 % sodium chloride bolus           DIAGNOSTIC RESULTS / EMERGENCY DEPARTMENT COURSE / MDM     LABS:  No results found for this visit on 07/29/21. RADIOLOGY:  None    EKG  None    All EKG's are interpreted by the Emergency Department Physician who either signs or Co-signs this chart in the absence of a cardiologist.    [address] MDM:  23 y.o. female who presents with a worsening headache. Suspect post LP headache given the fact that it is much worse with sitting up and her recent LP for intracranial hypertension. Will give headache cocktail, IV fluids and reach out to neurology who evaluated patient outpatient earlier today    Neurology has decided to admit patient. PROCEDURES:  None    CONSULTS:  IP CONSULT TO NEUROLOGY    CRITICAL CARE:  None    FINAL IMPRESSION      1. Other migraine without status migrainosus, not intractable            DISPOSITION / PLAN     DISPOSITION Admitted 07/29/2021 08:54:13 PM      PATIENT REFERRED TO:  No follow-up provider specified.     DISCHARGE MEDICATIONS:  New Prescriptions    No medications on file       Amanda Craft DO  Emergency Medicine Resident    (Please note that portions of thisnote were completed with a voice recognition program.  Efforts were made to edit the dictations but occasionally words are mis-transcribed.)       Amanda Craft DO  Resident  07/29/21 2054

## 2021-07-30 NOTE — ED NOTES
Report called to Jake Baker RN. All questions answered at this time. Will call prior to pt being transported from ED to [address] [place], [place] [address].       Florentino Anna RN  07/30/21 [id number]

## 2021-07-30 NOTE — PROGRESS NOTES
CLINICAL PHARMACY NOTE: MEDS TO BEDS    Total # of Prescriptions Filled: 1   The following medications were delivered to the patient:  · fioricet -40mg    Additional Documentation: delivered to patient in room 319 7/30 at 1735. Co-pay paid with credit on [employer] ($10.00).  NOEL

## 2021-08-02 ENCOUNTER — CARE COORDINATION (OUTPATIENT)
Dept: OTHER | Facility: CLINIC | Age: 21
End: 2021-08-02

## 2021-08-09 ENCOUNTER — CARE COORDINATION (OUTPATIENT)
Dept: OTHER | Facility: CLINIC | Age: 21
End: 2021-08-09

## 2021-08-09 NOTE — CARE COORDINATION
[facility] Transitions Follow Up Call    2021    Patient: Armin Minor  Patient : 2000   MRN: [id number]  Reason for Admission: IIH (idiopathic intracranial hypertension)  Discharge Date: 21 RARS: Readmission Risk Score: 6    ACM attempted to reach patient for Care Transitions call. HIPAA compliant message left requesting a return phone call at patients convenience. Will continue to follow. MyChart Letter sent.       Follow Up  Future Appointments   Date Time Provider Re Kelley   8/10/2021  4:15 PM ADRIÁN Palomino [facility]   10/7/2021  4:00 PM Lora Carballo MD Neuro [facility]

## 2021-08-10 ENCOUNTER — NURSE ONLY (OUTPATIENT)
Dept: OBGYN CLINIC | Age: 21
End: 2021-08-10
Payer: COMMERCIAL

## 2021-08-10 VITALS — WEIGHT: 250 LBS | DIASTOLIC BLOOD PRESSURE: 84 MMHG | SYSTOLIC BLOOD PRESSURE: 130 MMHG | BODY MASS INDEX: 40.35 KG/M2

## 2021-08-10 DIAGNOSIS — Z32.02 NEGATIVE PREGNANCY TEST: ICD-10-CM

## 2021-08-10 DIAGNOSIS — N94.6 DYSMENORRHEA: Primary | ICD-10-CM

## 2021-08-10 LAB
CONTROL: NORMAL
PREGNANCY TEST URINE, POC: NEGATIVE

## 2021-08-10 PROCEDURE — 81025 URINE PREGNANCY TEST: CPT | Performed by: NURSE PRACTITIONER

## 2021-08-10 PROCEDURE — 96372 THER/PROPH/DIAG INJ SC/IM: CPT | Performed by: NURSE PRACTITIONER

## 2021-08-10 RX ORDER — MEDROXYPROGESTERONE ACETATE 150 MG/ML
150 INJECTION, SUSPENSION INTRAMUSCULAR ONCE
Status: COMPLETED | OUTPATIENT
Start: 2021-08-10 | End: 2021-08-10

## 2021-08-10 RX ADMIN — MEDROXYPROGESTERONE ACETATE 150 MG: 150 INJECTION, SUSPENSION INTRAMUSCULAR at 16:31

## 2021-08-10 NOTE — PROGRESS NOTES
per [facility], ,depo provera give in left delt. LOT [id number] exp 8/22  upt negative. To return in 12 weeks for next injection. See MAR for further injection information.

## 2021-08-16 ENCOUNTER — CARE COORDINATION (OUTPATIENT)
Dept: OTHER | Facility: CLINIC | Age: 21
End: 2021-08-16

## 2021-08-30 ENCOUNTER — HOSPITAL ENCOUNTER (OUTPATIENT)
Age: 21
Setting detail: SPECIMEN
Discharge: HOME | End: 2021-08-30
Payer: COMMERCIAL

## 2021-10-07 ENCOUNTER — TELEMEDICINE (OUTPATIENT)
Dept: NEUROLOGY | Age: 21
End: 2021-10-07
Payer: COMMERCIAL

## 2021-10-07 DIAGNOSIS — G43.001 MIGRAINE WITHOUT AURA AND WITH STATUS MIGRAINOSUS, NOT INTRACTABLE: ICD-10-CM

## 2021-10-07 DIAGNOSIS — G93.2 PSEUDOTUMOR CEREBRI: Primary | ICD-10-CM

## 2021-10-07 PROCEDURE — 99214 OFFICE O/P EST MOD 30 MIN: CPT | Performed by: STUDENT IN AN ORGANIZED HEALTH CARE EDUCATION/TRAINING PROGRAM

## 2021-10-07 ASSESSMENT — ENCOUNTER SYMPTOMS
APNEA: 0
STRIDOR: 0
COUGH: 0
NAUSEA: 0
EYE DISCHARGE: 0
VOMITING: 0
CONSTIPATION: 0
CHEST TIGHTNESS: 0
EYE REDNESS: 0
ABDOMINAL PAIN: 0
DIARRHEA: 0
SHORTNESS OF BREATH: 0
SORE THROAT: 0
ABDOMINAL DISTENTION: 0
WHEEZING: 0

## 2021-10-07 NOTE — PROGRESS NOTES
[address] [place] [address] [address] [facility]  Dept: [phone]  Dept Fax: [phone]    NEUROLOGY FOLLOW UP PATIENT NOTE       PATIENT NAME: Chuck Combs  PATIENT MRN: [id number]  PRIMARY CARE PHYSICIAN: Jewels Geiger MD      HPI:      Chuck Combs is a 23 y.o. female      The patient is a 23 y.o. female presenting today as a referral from her optometrists office for bilateral papilledema. She was at the office for a routine glasses check. Reports a week long headache that is bifrontal R>L non radiating, pounding with photophobia that started suddenly 7 days prior. Reports being on acne medication and depo-provera. Denies visual disturbance, loss of vision or trauma. She reports no fevers, chills, night sweats or unexpected weight loss. BMI 41, non smoker. On the admission x2 days ago she had an LP completed by IR with 10 cc taken off. opening pressure 28, closing pressure 18. Started on diamox 250 mg BID and was encouraged to d/c retinoid medications.        Returns today with worsening headaches, bifrontal, non radiating pressure and 9/10. Severe nausea with emesis. Has had x2 episodes of emesis. Denies visual changes, weakness, incontinence, or speech changes. Endorses some mild tingling in the fingertips and toes bilat. 10/7/2021:   Drastic improvement in migraines. After her last visit went to the ED and received migraine cocktail and IV caffeine which resolved her migraine. Has headaches only once per month, lasting 20 minutes or less rx with rest. Saw Dr. Kari Lee and was told her IOPs were 18 (no records in our system) without visual acuity loss. Reccommended to switch OCPs to non hormonal alternative, she has an appointment with her OB next week for an IUD. Tolerating diamox 250 BID some intermittent tingling in both hands that has significantly improved.         PREVIOUS WORKUP:     Lab Results   Component Value Date WBC 9.2 07/30/2021    HGB 13.4 07/30/2021    HCT 43.1 07/30/2021    MCV 91.3 07/30/2021     07/30/2021       Past Medical History:   Diagnosis Date    Cholecystitis     gallstones    Migraine     Post lumbar puncture headache         Past Surgical History:   Procedure Laterality Date    CHOLECYSTECTOMY, LAPAROSCOPIC  09/22/2017    CHOLECYSTECTOMY, LAPAROSCOPIC N/A 9/22/2017    CHOLECYSTECTOMY LAPAROSCOPIC, POSSIBLE COMMON BILE DUCT EXPLORATION, POSSIBLE IOC, **SHORT STAY** performed by Mitchell Lira MD at [address] [place] [address] History     Socioeconomic History    Marital status: Single     Spouse name: Not on file    Number of children: Not on file    Years of education: Not on file    Highest education level: Not on file   Occupational History    Not on file   Tobacco Use    Smoking status: Never Smoker    Smokeless tobacco: Never Used   Substance and Sexual Activity    Alcohol use: Yes    Drug use: No    Sexual activity: Yes     Birth control/protection: Injection   Other Topics Concern    Not on file   Social History Narrative    She is a senior in high school. She hopes to attend college next year. She plays the [employer] in band. Social Determinants of Health     Financial Resource Strain:     Difficulty of Paying Living Expenses:    Food Insecurity:     Worried About Running Out of Food in the Last Year:     [address] in the Last Year:    Transportation Needs:     Lack of Transportation (Medical):      Lack of Transportation (Non-Medical):    Physical Activity:     Days of Exercise per Week:     Minutes of Exercise per Session:    Stress:     Feeling of Stress :    Social Connections:     Frequency of Communication with Friends and Family:     Frequency of Social Gatherings with Friends and Family:     Attends Mormon Services:     Active Member of Clubs or Organizations:     Attends Club or Organization Meetings:    Neal Robins Marital Status:    Intimate Partner Violence:     Fear of Current or Ex-Partner:     Emotionally Abused:     Physically Abused:     Sexually Abused:         Current Outpatient Medications   Medication Sig Dispense Refill    butalbital-acetaminophen-caffeine (FIORICET, ESGIC) -40 MG per tablet Take 1 tablet by mouth every 6 hours as needed for Headaches 30 tablet 0    acetaZOLAMIDE (DIAMOX) 250 MG tablet Take 1 tablet by mouth 2 times daily 90 tablet 3    medroxyPROGESTERone (DEPO-PROVERA) 150 MG/ML injection 1 mL      omeprazole (PRILOSEC) 10 MG delayed release capsule Take 10 mg by mouth daily (Patient not taking: Reported on 7/29/2021)      desmopressin (DDAVP) 0.1 MG tablet Take 0.1 mg by mouth 2 times daily (Patient not taking: Reported on 7/29/2021)       No current facility-administered medications for this visit. No Known Allergies     REVIEW OF SYSTEMS:     Review of Systems   Constitutional: Negative for activity change, appetite change, chills, diaphoresis, fatigue and fever. HENT: Negative for sore throat. Eyes: Negative for discharge and redness. Respiratory: Negative for apnea, cough, chest tightness, shortness of breath, wheezing and stridor. Cardiovascular: Negative for chest pain and leg swelling. Gastrointestinal: Negative for abdominal distention, abdominal pain, constipation, diarrhea, nausea and vomiting. Genitourinary: Negative for difficulty urinating, dysuria, flank pain, frequency, hematuria and urgency. Musculoskeletal: Negative for arthralgias. Neurological: Negative for dizziness, tremors, seizures, syncope, facial asymmetry, speech difficulty, weakness, light-headedness, numbness and headaches. Hematological: Negative for adenopathy. VITALS  There were no vitals taken for this visit.      PHYSICAL EXAMINATION:     AOx3  MAEW       IMAGING:     IR LUMBAR PUNCTURE FOR DIAGNOSIS    Result Date: 7/27/2021  Successful fluoroscopic-guided lumbar puncture. MRV HEAD W WO CONTRAST    Result Date: 7/27/2021  Unremarkable MRV of the head. MRI BRAIN W WO CONTRAST    Result Date: 7/27/2021  1. Flattening of the posterior globes at the optic nerve insertions compatible with provided history of papilledema. 2. No intracranial mass, acute infarction, hydrocephalus, or midline shift. ASSESSMENT:       Case of a 22 yo F referred to the ED by her optometrist for papilledema during a routine eye exam. Bifrontal R>L bifrontal headaches ongoing for 1 week suspicious for IIH. Non focal exam.        PLAN:       1. She needs to go to the ED to get IVF, IV caffeine and may need a blood patch  2. Continue Diamox 250 Mg BID, will consider increasing to 500 mg BID on follow up  3. Discussed medications side effects, patient verbalized understanding  4. No OCPs, will be seeing OB next week for non hormonal alternatives  5. Saw Dr. Jeff Florentino in interim, no visual acuity changes, IOP 18 per the patient, next appointment 2/2022  6. Recommended weight loss. 7. RTC 6 month VV    Ms. Wilkinson received counseling on the following healthy behaviors: medical compliance, smoking cessation, blood pressure control, regular follow up with primary doctor.         Electronically signed by       Mateo Johnson MD, MELITA  PGY-4 Neurology   10/7/2021 at 3:35 PM

## 2021-10-22 ENCOUNTER — VIRTUAL VISIT (OUTPATIENT)
Dept: OBGYN CLINIC | Age: 21
End: 2021-10-22
Payer: COMMERCIAL

## 2021-10-22 DIAGNOSIS — G93.2 IIH (IDIOPATHIC INTRACRANIAL HYPERTENSION): ICD-10-CM

## 2021-10-22 DIAGNOSIS — N94.6 DYSMENORRHEA: Primary | ICD-10-CM

## 2021-10-22 PROCEDURE — 99214 OFFICE O/P EST MOD 30 MIN: CPT | Performed by: OBSTETRICS & GYNECOLOGY

## 2021-10-22 NOTE — PROGRESS NOTES
TYMPANOSTOMY TUBE PLACEMENT           Family History   Problem Relation Age of Onset    Other Maternal Grandmother         gallbladder disease    Other Paternal Grandmother         gallbladder disease    Bleeding Prob Neg Hx     Seizures Neg Hx     Anesth Problems Neg Hx          Social History     Tobacco Use    Smoking status: Never Smoker    Smokeless tobacco: Never Used   Substance Use Topics    Alcohol use: Yes    Drug use: No         MEDICATIONS:  Current Outpatient Medications   Medication Sig Dispense Refill    butalbital-acetaminophen-caffeine (FIORICET, ESGIC) -40 MG per tablet Take 1 tablet by mouth every 6 hours as needed for Headaches 30 tablet 0    acetaZOLAMIDE (DIAMOX) 250 MG tablet Take 1 tablet by mouth 2 times daily 90 tablet 3    medroxyPROGESTERone (DEPO-PROVERA) 150 MG/ML injection 1 mL      omeprazole (PRILOSEC) 10 MG delayed release capsule Take 10 mg by mouth daily (Patient not taking: Reported on 7/29/2021)      desmopressin (DDAVP) 0.1 MG tablet Take 0.1 mg by mouth 2 times daily (Patient not taking: Reported on 7/29/2021)       No current facility-administered medications for this visit. ALLERGIES:  Allergies as of 10/22/2021    (No Known Allergies)         not currently breastfeeding. PE is limited due to the virtual visit    Abdomen: Soft non-tender; good bowel sounds. No guarding, rebound or rigidity. No CVA tenderness bilaterally reported when questioned. (Viewed Virtually)    Extremities: No calf tenderness, DTR 2/4, and No edema bilaterally as inspected by video and palpation by the patient (Viewed Virtually)    Pelvic: (Virtual Visit-Not Completed)    Diagnostics:  No results found. Lab Results:  Results for orders placed or performed during the hospital encounter of 08/30/21   C.trachomatis N.gonorrhoeae DNA, Urine    Specimen: Urine   Result Value Ref Range    Specimen Description . URINE     C. trachomatis DNA ,Urine NEGATIVE NEGATIVE    N. gonorrhoeae DNA, Urine NEGATIVE NEGATIVE           Assessment:   Diagnosis Orders   1. Dysmenorrhea     2. IIH (idiopathic intracranial hypertension)       Chief Complaint   Patient presents with    Follow-up         Patient Active Problem List    Diagnosis Date Noted    IIH (idiopathic intracranial hypertension) 07/30/2021    Post lumbar puncture headache     Migraine     Numbness and tingling     Headache 07/29/2021       PLAN:  Return in about 2 weeks (around 11/5/2021) for Follow Up Current Problem-Virtual Visit. SEE HPI  [facility] to get a note placed that depicts pts proper current medical management for her dysmenorrea. Get updated option for Mirena from neurology in lieu fo Depo=Provera as pt is declining Copper T380a IUD option due to its SE profile. Return to the office in 2 weeks. Counseled on preventative health maintenance follow-up. No orders of the defined types were placed in this encounter. Counseling Hormonal Based Birth Control:  The patient was seen and counseled on all forms of birth control both male and female  reversible and non. She is aware that hormonal based birth control may increase her risk of developing a blood clot which may increase her morbidity and or mortality. She was counseled on alternate non hormonal based contraception options. We discussed that smoking and any hormonal based contraception may increase the patients risks of developing these life threatening blood clots. All patients are encouraged to stop smoking at the time of contraceptive counseling. Cessation programs were reviewed. The patient was instructed to use barrier contraception for sexually transmitted disease prevention. The patient was also informed of antibiotics decreasing contraceptive efficacy and the need for barrier contraception from the onset of her antibiotic dosing and through a minimum of thirty days from antibiotic cessation.     The life threatening side effect profile was reviewed in detail this includes but is not limited to shortness of breath, chest pain, severe or persistent headaches, or calf pain. If any of these occur the patient has been instructed to stop using her hormonal based contraception, notify the office, and go to the emergency department or call 911. The patient denied any personal history of blood clots in her leg, lung, or heart and denied any family history of stroke, TIA, sudden cardiac death < 36 y.o.,pulmonary embolism, or deep venous thrombosis. Kiko Saucedo is a 23 y.o. female female was evaluated by a Virtual Visit (video visit) encounter to address concerns as mentioned above. A caregiver was present when appropriate. Due to this being a TeleHealth encounter (During [address] emergency), evaluation of the following organ systems was limited: Vitals/Constitutional/EENT/Resp/CV/GI//MS/Neuro/Skin/Heme-Lymph-Imm. Pursuant to the emergency declaration under the [address] [place] [address] and the [employer] Act, this Virtual Visit was conducted with patient's (and/or legal guardian's) consent, to reduce the patient's risk of exposure to COVID-19 and provide necessary medical care. The patient (and/or legal guardian) has also been advised to contact this office for worsening conditions or problems, and seek emergency medical treatment and/or call 911 if deemed necessary. Services were provided through a video synchronous discussion virtually to substitute for in-person clinic visit. Patient and provider were located at their individual homes. Electronically signed by Nica Cedeño DO on 10/22/21 at 9:33 AM EDT     An electronic signature was used to authenticate this note.           The patient, Kiko Saucedo is a 23 y.o. female, was seen with a total time spent of 30 minutes for the visit on this date of

## 2021-11-17 ENCOUNTER — TELEPHONE (OUTPATIENT)
Dept: NEUROLOGY | Age: 21
End: 2021-11-17

## 2021-11-17 NOTE — TELEPHONE ENCOUNTER
From a neurology standpoint, a Mirena device is not recommended. Data shows increased rates of intracerebral hypertension with intrauterine levonorgestrel (IUL) device Mirena.      Herminio Barnett MD, Jaye Stovall 1499  PGY-4 Neurology   11/18/2021 at 5:26 PM

## 2021-11-17 NOTE — TELEPHONE ENCOUNTER
Pt states that she has a GYN appointment, needs to know if she can still be on birthcontrol,. Her GYN will bee free to answer and concerns. Dr Flores [facility]  [phone].

## 2021-11-18 ENCOUNTER — TELEPHONE (OUTPATIENT)
Dept: OBGYN CLINIC | Age: 21
End: 2021-11-18

## 2021-11-18 NOTE — TELEPHONE ENCOUNTER
Recalled pt informed her that Mirena would not be safe for her , pt can continue on Depo or medication recommended by OBGYN. Pt expressed understanding.

## 2021-11-18 NOTE — TELEPHONE ENCOUNTER
Returned call to pt re: birth control. Per Dr. Simeon Rodriguez (neurology); pt can take depo-provera. Notes in telephone encounter from 11/17/21. Pt has f/u appt with Dr. Hali Chauhan on 11/30/21 and encouraged her to keep that appointment.

## 2022-02-24 RX ORDER — ACETAZOLAMIDE 250 MG/1
250 TABLET ORAL 2 TIMES DAILY
Qty: 90 TABLET | Refills: 3 | Status: SHIPPED | OUTPATIENT
Start: 2022-02-24

## 2022-03-08 ENCOUNTER — TELEPHONE (OUTPATIENT)
Dept: NEUROLOGY | Age: 22
End: 2022-03-08

## 2022-03-08 NOTE — TELEPHONE ENCOUNTER
Patient would like to know due to her intracranial hypertension would it be okay for her to donate plasma.

## 2022-04-14 ENCOUNTER — TELEMEDICINE (OUTPATIENT)
Dept: NEUROLOGY | Age: 22
End: 2022-04-14

## 2022-04-14 DIAGNOSIS — G43.001 MIGRAINE WITHOUT AURA AND WITH STATUS MIGRAINOSUS, NOT INTRACTABLE: ICD-10-CM

## 2022-04-14 DIAGNOSIS — G93.2 PSEUDOTUMOR CEREBRI: Primary | ICD-10-CM

## 2022-04-14 PROCEDURE — 99214 OFFICE O/P EST MOD 30 MIN: CPT | Performed by: PSYCHIATRY & NEUROLOGY

## 2022-04-14 ASSESSMENT — ENCOUNTER SYMPTOMS
SORE THROAT: 0
NAUSEA: 0
ABDOMINAL PAIN: 0
COUGH: 0
CHEST TIGHTNESS: 0
EYE DISCHARGE: 0
ABDOMINAL DISTENTION: 0
WHEEZING: 0
DIARRHEA: 0
EYE REDNESS: 0
STRIDOR: 0
SHORTNESS OF BREATH: 0
APNEA: 0
VOMITING: 0
CONSTIPATION: 0

## 2022-04-14 NOTE — PROGRESS NOTES
[address] [place] [address] [address] [facility]  Dept: [phone]  Dept Fax: [phone]    NEUROLOGY FOLLOW UP PATIENT NOTE       PATIENT NAME: Shantel Suarez  PATIENT MRN: [id number]  PRIMARY CARE PHYSICIAN: Henretta Kocher, MD      HPI:      Shantel Suarez is a 24 y.o. female      The patient is a 23 y.o. female presenting today as a referral from her optometrists office for bilateral papilledema. She was at the office for a routine glasses check. Reports a week long headache that is bifrontal R>L non radiating, pounding with photophobia that started suddenly 7 days prior. Reports being on acne medication and depo-provera. Denies visual disturbance, loss of vision or trauma. She reports no fevers, chills, night sweats or unexpected weight loss. BMI 41, non smoker. On the admission x2 days ago she had an LP completed by IR with 10 cc taken off. opening pressure 28, closing pressure 18. Started on diamox 250 mg BID and was encouraged to d/c retinoid medications.        Returns today with worsening headaches, bifrontal, non radiating pressure and 9/10. Severe nausea with emesis. Has had x2 episodes of emesis. Denies visual changes, weakness, incontinence, or speech changes. Endorses some mild tingling in the fingertips and toes bilat. 10/7/2021:   Drastic improvement in migraines. After her last visit went to the ED and received migraine cocktail and IV caffeine which resolved her migraine. Has headaches only once per month, lasting 20 minutes or less rx with rest. Saw Dr. Kei Brower and was told her IOPs were 18 (no records in our system) without visual acuity loss. Reccommended to switch OCPs to non hormonal alternative, she has an appointment with her OB next week for an IUD. Tolerating diamox 250 BID some intermittent tingling in both hands that has significantly improved.         4/14/22:   Saw her ophthalmologist last week and was told her pressures were significantly better but not quite back to normal. She feels her vision is back to baseline. Continues on diamox 250 mg BID. Last weight 251. PREVIOUS WORKUP:     Lab Results   Component Value Date    WBC 9.2 07/30/2021    HGB 13.4 07/30/2021    HCT 43.1 07/30/2021    MCV 91.3 07/30/2021     07/30/2021       Past Medical History:   Diagnosis Date    Cholecystitis     gallstones    Migraine     Post lumbar puncture headache         Past Surgical History:   Procedure Laterality Date    CHOLECYSTECTOMY, LAPAROSCOPIC  09/22/2017    CHOLECYSTECTOMY, LAPAROSCOPIC N/A 9/22/2017    CHOLECYSTECTOMY LAPAROSCOPIC, POSSIBLE COMMON BILE DUCT EXPLORATION, POSSIBLE IOC, **SHORT STAY** performed by Ebony Marshall MD at [facility] History     Socioeconomic History    Marital status: Single     Spouse name: Not on file    Number of children: Not on file    Years of education: Not on file    Highest education level: Not on file   Occupational History    Not on file   Tobacco Use    Smoking status: Never Smoker    Smokeless tobacco: Never Used   Substance and Sexual Activity    Alcohol use: Yes    Drug use: No    Sexual activity: Yes     Birth control/protection: Injection   Other Topics Concern    Not on file   Social History Narrative    She is a senior in high school. She hopes to attend college next year. She plays the [employer] in band. Social Determinants of Health     Financial Resource Strain:     Difficulty of Paying Living Expenses: Not on file   Food Insecurity:     Worried About Running Out of Food in the Last Year: Not on file    Svitlana of Food in the Last Year: Not on file   Transportation Needs:     Lack of Transportation (Medical): Not on file    Lack of Transportation (Non-Medical):  Not on file   Physical Activity:     Days of Exercise per Week: Not on file    Minutes of Exercise per Session: Not on file Stress:     Feeling of Stress : Not on file   Social Connections:     Frequency of Communication with Friends and Family: Not on file    Frequency of Social Gatherings with Friends and Family: Not on file    Attends Denominational Services: Not on file    Active Member of [address] [place] [address] or Organizations: Not on file    Attends Club or Organization Meetings: Not on file    Marital Status: Not on file   Intimate Partner Violence:     Fear of Current or Ex-Partner: Not on file    Emotionally Abused: Not on file    Physically Abused: Not on file    Sexually Abused: Not on file   Housing Stability:     Unable to Pay for Housing in the Last Year: Not on file    Number of [place] in the Last Year: Not on file    Unstable Housing in the Last Year: Not on file        Current Outpatient Medications   Medication Sig Dispense Refill    acetaZOLAMIDE (DIAMOX) 250 MG tablet Take 1 tablet by mouth 2 times daily 90 tablet 3    butalbital-acetaminophen-caffeine (FIORICET, ESGIC) -40 MG per tablet Take 1 tablet by mouth every 6 hours as needed for Headaches (Patient not taking: Reported on 11/30/2021) 30 tablet 0    medroxyPROGESTERone (DEPO-PROVERA) 150 MG/ML injection 1 mL (Patient not taking: Reported on 11/30/2021)      omeprazole (PRILOSEC) 10 MG delayed release capsule Take 10 mg by mouth daily (Patient not taking: Reported on 7/29/2021)       No current facility-administered medications for this visit. No Known Allergies     REVIEW OF SYSTEMS:     Review of Systems   Constitutional: Negative for activity change, appetite change, chills, diaphoresis, fatigue and fever. HENT: Negative for sore throat. Eyes: Negative for discharge and redness. Respiratory: Negative for apnea, cough, chest tightness, shortness of breath, wheezing and stridor. Cardiovascular: Negative for chest pain and leg swelling.    Gastrointestinal: Negative for abdominal distention, abdominal pain, constipation, diarrhea, nausea and vomiting. Genitourinary: Negative for difficulty urinating, dysuria, flank pain, frequency, hematuria and urgency. Musculoskeletal: Negative for arthralgias. Neurological: Negative for dizziness, tremors, seizures, syncope, facial asymmetry, speech difficulty, weakness, light-headedness, numbness and headaches. Hematological: Negative for adenopathy. VITALS  There were no vitals taken for this visit. PHYSICAL EXAMINATION:     AOx3  MAEW       IMAGING:     IR LUMBAR PUNCTURE FOR DIAGNOSIS    Result Date: 7/27/2021  Successful fluoroscopic-guided lumbar puncture. MRV HEAD W WO CONTRAST    Result Date: 7/27/2021  Unremarkable MRV of the head. MRI BRAIN W WO CONTRAST    Result Date: 7/27/2021  1. Flattening of the posterior globes at the optic nerve insertions compatible with provided history of papilledema. 2. No intracranial mass, acute infarction, hydrocephalus, or midline shift. ASSESSMENT:       Case of a 24 yo F referred to the ED by her optometrist for papilledema during a routine eye exam. Bifrontal R>L bifrontal headaches ongoing for 1 week suspicious for IIH. Non focal exam.        PLAN:         1. Continue Diamox 250 Mg BID, will consider increasing to 500 mg BID on follow up  2. Discussed medications side effects, patient verbalized understanding  3. Off depoprovera since 11/2021  4. Saw Dr. Lona Scott in interim, no visual acuity changes, IOP WNL, next appointment 9/2022  5. Recommended weight loss. 6. RTC 6 month VV    Ms. Wilkinson received counseling on the following healthy behaviors: medical compliance, smoking cessation, blood pressure control, regular follow up with primary doctor.         Electronically signed by       Carmela Wilson MD, MELITA  PGY-4 Neurology   4/14/2022 at 1:45 PM

## 2022-04-14 NOTE — PATIENT INSTRUCTIONS
Schedule a Vaccine  When you qualify to receive the vaccine, call the [facility]) COVID-19 Vaccination Hotline to schedule your appointment or to get additional information about the [facility]) locations which are offering the COVID-19 vaccine. To be 94% effective, it's important that you receive two doses of one of the COVID-19 vaccines. -If you are receiving the [employer] vaccine, your second shot will be scheduled as close to 21 days after the first shot as possible. -If you are receiving the Moderna vaccine, your second shot will be scheduled as close to 28 days after the first shot as possible. [facility]) COVID-19 Vaccination Hotline: [phone]    Links to [facility]) website and [facility] website:    [email or web address]    [email or web address]

## 2022-10-03 ENCOUNTER — HOSPITAL ENCOUNTER (OUTPATIENT)
Age: 22
Setting detail: SPECIMEN
Discharge: HOME | End: 2022-10-03

## 2022-11-22 ENCOUNTER — OFFICE VISIT (OUTPATIENT)
Dept: NEUROLOGY | Age: 22
End: 2022-11-22
Payer: COMMERCIAL

## 2022-11-22 VITALS
DIASTOLIC BLOOD PRESSURE: 87 MMHG | SYSTOLIC BLOOD PRESSURE: 137 MMHG | HEIGHT: 66 IN | OXYGEN SATURATION: 98 % | BODY MASS INDEX: 40.34 KG/M2 | HEART RATE: 102 BPM | WEIGHT: 251 LBS

## 2022-11-22 DIAGNOSIS — G93.2 PSEUDOTUMOR CEREBRI: Primary | ICD-10-CM

## 2022-11-22 PROCEDURE — 99214 OFFICE O/P EST MOD 30 MIN: CPT | Performed by: PSYCHIATRY & NEUROLOGY

## 2022-11-22 ASSESSMENT — ENCOUNTER SYMPTOMS
VOMITING: 0
NAUSEA: 0
SINUS PAIN: 0
ABDOMINAL PAIN: 0
COUGH: 0
APNEA: 0
EYE PAIN: 0
ABDOMINAL DISTENTION: 0
SHORTNESS OF BREATH: 0
CHEST TIGHTNESS: 0

## 2022-11-22 ASSESSMENT — VISUAL ACUITY: VA_NORMAL: 1

## 2022-11-22 NOTE — PATIENT INSTRUCTIONS
Taper off diamox over one week in march decrease to 250mg AM for 7 days then discontinue  Follow Optho for eye appointment late march  If any blurry vision or increase in headaches once off diamox can resume and plan to continue long term diamox 250mg BID vs transition to topamax to help with weight loss  No need for repeat LP and plan to follow neurologic exam and symptoms of headache and vision. Negrito DO: Patient feels better at this time; vincent placed- to f/u with urology in 1-2 days without fail; no abdominal pain; no nausea or vomiting; no urinary complaints; Cr WNL; UA negative; will f/u with urology for vincent removal. Negrito DO: Vincent placed by nursing staff. Patient feels better at this time; vincent placed (by nursing staff)- to f/u with urology in 1-2 days without fail; no abdominal pain; no nausea or vomiting; no urinary complaints; Cr WNL; UA negative; will f/u with urology for vincent removal.

## 2023-01-31 ENCOUNTER — HOSPITAL ENCOUNTER (OUTPATIENT)
Age: 23
Setting detail: SPECIMEN
Discharge: HOME | End: 2023-01-31

## 2023-01-31 ENCOUNTER — OFFICE VISIT (OUTPATIENT)
Dept: FAMILY MEDICINE CLINIC | Age: 23
End: 2023-01-31
Payer: COMMERCIAL

## 2023-01-31 VITALS
WEIGHT: 256 LBS | SYSTOLIC BLOOD PRESSURE: 135 MMHG | BODY MASS INDEX: 41.14 KG/M2 | TEMPERATURE: 98.4 F | HEART RATE: 90 BPM | HEIGHT: 66 IN | OXYGEN SATURATION: 98 % | DIASTOLIC BLOOD PRESSURE: 82 MMHG

## 2023-01-31 DIAGNOSIS — R35.0 URINARY FREQUENCY: ICD-10-CM

## 2023-01-31 DIAGNOSIS — Z11.3 SCREEN FOR STD (SEXUALLY TRANSMITTED DISEASE): ICD-10-CM

## 2023-01-31 DIAGNOSIS — N89.8 VAGINAL DISCHARGE: Primary | ICD-10-CM

## 2023-01-31 DIAGNOSIS — R39.11 URINARY HESITANCY: ICD-10-CM

## 2023-01-31 LAB
BILIRUBIN, POC: NEGATIVE
BLOOD URINE, POC: NEGATIVE
CLARITY, POC: ABNORMAL
COLOR, POC: ABNORMAL
CONTROL: NORMAL
GLUCOSE URINE, POC: NEGATIVE
KETONES, POC: ABNORMAL
LEUKOCYTE EST, POC: ABNORMAL
NITRITE, POC: NEGATIVE
PH, POC: 5.5
PREGNANCY TEST URINE, POC: NEGATIVE
PROTEIN, POC: ABNORMAL
SPECIFIC GRAVITY, POC: >=1.03
UROBILINOGEN, POC: 0.2

## 2023-01-31 PROCEDURE — 99213 OFFICE O/P EST LOW 20 MIN: CPT | Performed by: NURSE PRACTITIONER

## 2023-01-31 PROCEDURE — 81025 URINE PREGNANCY TEST: CPT | Performed by: NURSE PRACTITIONER

## 2023-01-31 PROCEDURE — 81003 URINALYSIS AUTO W/O SCOPE: CPT | Performed by: NURSE PRACTITIONER

## 2023-01-31 SDOH — ECONOMIC STABILITY: FOOD INSECURITY: WITHIN THE PAST 12 MONTHS, YOU WORRIED THAT YOUR FOOD WOULD RUN OUT BEFORE YOU GOT MONEY TO BUY MORE.: NEVER TRUE

## 2023-01-31 SDOH — ECONOMIC STABILITY: FOOD INSECURITY: WITHIN THE PAST 12 MONTHS, THE FOOD YOU BOUGHT JUST DIDN'T LAST AND YOU DIDN'T HAVE MONEY TO GET MORE.: NEVER TRUE

## 2023-01-31 ASSESSMENT — ENCOUNTER SYMPTOMS
NAUSEA: 0
VOMITING: 0

## 2023-01-31 ASSESSMENT — PATIENT HEALTH QUESTIONNAIRE - PHQ9
SUM OF ALL RESPONSES TO PHQ QUESTIONS 1-9: 0
SUM OF ALL RESPONSES TO PHQ9 QUESTIONS 1 & 2: 0
2. FEELING DOWN, DEPRESSED OR HOPELESS: 0
SUM OF ALL RESPONSES TO PHQ QUESTIONS 1-9: 0
1. LITTLE INTEREST OR PLEASURE IN DOING THINGS: 0
SUM OF ALL RESPONSES TO PHQ QUESTIONS 1-9: 0
SUM OF ALL RESPONSES TO PHQ QUESTIONS 1-9: 0

## 2023-01-31 ASSESSMENT — SOCIAL DETERMINANTS OF HEALTH (SDOH): HOW HARD IS IT FOR YOU TO PAY FOR THE VERY BASICS LIKE FOOD, HOUSING, MEDICAL CARE, AND HEATING?: NOT HARD AT ALL

## 2023-01-31 NOTE — PROGRESS NOTES
[address] [address]  Dept: [phone]  Dept Fax: [phone]    Yari Arora is a 20 y.o. female who presents to the urgent care today for her medical conditions/complaints as notedbelow. Yari Arora is c/o of Vaginal Discharge (No odor was clear and a little yellow two weeks ago ) and Dysuria (Hard to urinate today was having pain with urination for two weeks)      HPI:     20 yr old female presents for vaginal discharge and possible uti. Reports white/yellow vaginal discharge then turned just white. Was itchy, now it is not. Also having urinary hesitancy, some fx. No burning  Drank cranberry juice and azo - last dose 5 days ago  Concerned for std  No rash  Stopping depo shot  Lmp 1/16/2023      Urinary Frequency   This is a new problem. The current episode started in the past 7 days. The problem occurs every urination. The problem has been unchanged. The pain is at a severity of 0/10. The patient is experiencing no pain. There has been no fever. She is Sexually active. There is No history of pyelonephritis. Associated symptoms include a discharge, frequency, hesitancy and urgency. Pertinent negatives include no chills, flank pain, hematuria, nausea, possible pregnancy, sweats or vomiting. She has tried nothing for the symptoms. The treatment provided mild relief. There is no history of kidney stones, recurrent UTIs or a single kidney.      Past Medical History:   Diagnosis Date    Cholecystitis     gallstones    Migraine     Post lumbar puncture headache         Current Outpatient Medications   Medication Sig Dispense Refill    acetaZOLAMIDE (DIAMOX) 250 MG tablet Take 1 tablet by mouth 2 times daily 90 tablet 3    butalbital-acetaminophen-caffeine (FIORICET, ESGIC) -40 MG per tablet Take 1 tablet by mouth every 6 hours as needed for Headaches (Patient not taking: No sig reported) 30 tablet 0    medroxyPROGESTERone (DEPO-PROVERA) 150 MG/ML injection 1 mL (Patient not taking: Reported on 11/22/2022)       No current facility-administered medications for this visit. No Known Allergies    Subjective:      Review of Systems   Constitutional:  Negative for chills. Gastrointestinal:  Negative for nausea and vomiting. Genitourinary:  Positive for frequency, hesitancy and urgency. Negative for flank pain and hematuria. All other systems reviewed and are negative. 14 systems reviewed and negative except as listed in HPI. Objective:     Physical Exam  Vitals and nursing note reviewed. Constitutional:       General: She is not in acute distress. Appearance: Normal appearance. She is well-developed. She is not ill-appearing, toxic-appearing or diaphoretic. Comments: nontoxic   HENT:      Head: Normocephalic and atraumatic. Right Ear: External ear normal.      Left Ear: External ear normal.   Eyes:      General: No scleral icterus. Right eye: No discharge. Left eye: No discharge. Conjunctiva/sclera: Conjunctivae normal.      Pupils: Pupils are equal, round, and reactive to light. Cardiovascular:      Rate and Rhythm: Normal rate and regular rhythm. Pulses: Normal pulses. Heart sounds: Normal heart sounds. Pulmonary:      Effort: Pulmonary effort is normal.      Breath sounds: Normal breath sounds. Abdominal:      General: Bowel sounds are normal. There is no distension. Palpations: Abdomen is soft. There is no mass. Tenderness: There is no abdominal tenderness. There is no right CVA tenderness, left CVA tenderness, guarding or rebound. Hernia: No hernia is present. Genitourinary:     Comments: Deferred, chose to self swab  Musculoskeletal:         General: No tenderness or deformity. Normal range of motion. Cervical back: Neck supple. Skin:     General: Skin is warm and dry.       Capillary Refill: Capillary refill takes less than 2 seconds. Findings: No rash ( no rash to visible skin). Neurological:      General: No focal deficit present. Mental Status: She is alert and oriented to person, place, and time. Motor: No abnormal muscle tone. Coordination: Coordination normal.   Psychiatric:         Mood and Affect: Mood normal.         Behavior: Behavior normal.     /82   Pulse 90   Temp 98.4 °F (36.9 °C)   Ht 5' 6\" (1.676 m)   Wt 256 lb (116.1 kg)   SpO2 98%   BMI 41.32 kg/m²     Assessment:       Diagnosis Orders   1. Vaginal discharge  Vaginitis DNA Probe    POCT urine pregnancy    C.trachomatis N.gonorrhoeae DNA, Urine      2. Urinary hesitancy  POCT urine pregnancy    POCT Urinalysis No Micro (Auto)    Culture, Urine      3. Urinary frequency  POCT urine pregnancy    POCT Urinalysis No Micro (Auto)    Culture, Urine      4.  Screen for STD (sexually transmitted disease)  C.trachomatis N.gonorrhoeae DNA, Urine    HERPES PROFILE    T. pallidum Ab    HIV Screen    Hepatitis Panel, Acute          Plan:      Results for POC orders placed in visit on 01/31/23   POCT Urinalysis No Micro (Auto)   Result Value Ref Range    Color, UA red     Clarity, UA cloudy     Glucose, UA POC negative     Bilirubin, UA negative     Ketones, UA Trace     Spec Grav, UA >=1.030     Blood, UA POC Negative     pH, UA 5.5     Protein, UA POC Trace     Urobilinogen, UA 0.2     Leukocytes, UA Large     Nitrite, UA negative    POCT urine pregnancy   Result Value Ref Range    Preg Test, Ur negative     Control         Here with vague, discharge and uti like sx  POCT preg neg  POCT urine concentrated, + leuks  Urine culture pending  Pt enc to increase water intake  Std urine studies and bloodwork ordered  Use condom now that discontinued depo  Will call with results and tx based on findings  No meds ordered at this time  Pt agreeable to poc  Return if symptoms worsen or fail to improve, for Make an Appt. with your Primary Care in 1 week. No orders of the defined types were placed in this encounter. Patient given educational materials - see patient instructions. Discussed use, benefit, and side effects of prescribed medications. All patient questions answered. Pt voicedunderstanding.     Electronically signed by DARIAN Justin CNP on 1/31/2023 at 5:23 PM

## 2023-02-01 DIAGNOSIS — Z11.3 SCREEN FOR STD (SEXUALLY TRANSMITTED DISEASE): ICD-10-CM

## 2023-02-01 DIAGNOSIS — N89.8 VAGINAL DISCHARGE: ICD-10-CM

## 2023-02-01 DIAGNOSIS — N76.0 BACTERIAL VAGINOSIS: Primary | ICD-10-CM

## 2023-02-01 DIAGNOSIS — B96.89 BACTERIAL VAGINOSIS: Primary | ICD-10-CM

## 2023-02-01 LAB
CANDIDA SPECIES, DNA PROBE: NEGATIVE
CHLAMYDIA DNA UR QL NAA+PROBE: NEGATIVE
GARDNERELLA VAGINALIS, DNA PROBE: POSITIVE
N GONORRHOEA DNA UR QL NAA+PROBE: NEGATIVE
SOURCE: ABNORMAL
SPECIMEN DESCRIPTION: NORMAL
TRICHOMONAS VAGINALIS DNA: NEGATIVE

## 2023-02-01 RX ORDER — METRONIDAZOLE 500 MG/1
500 TABLET ORAL 2 TIMES DAILY
Qty: 14 TABLET | Refills: 0 | Status: SHIPPED | OUTPATIENT
Start: 2023-02-01 | End: 2023-02-08

## 2023-02-02 LAB
MICROORGANISM SPEC CULT: NO GROWTH
SPECIMEN DESCRIPTION: NORMAL

## 2023-03-23 ENCOUNTER — OFFICE VISIT (OUTPATIENT)
Dept: NEUROLOGY | Age: 23
End: 2023-03-23

## 2023-03-23 VITALS
HEIGHT: 66 IN | HEART RATE: 115 BPM | TEMPERATURE: 98.1 F | WEIGHT: 256 LBS | SYSTOLIC BLOOD PRESSURE: 139 MMHG | BODY MASS INDEX: 41.14 KG/M2 | OXYGEN SATURATION: 98 % | DIASTOLIC BLOOD PRESSURE: 92 MMHG

## 2023-03-23 DIAGNOSIS — E55.9 VITAMIN D DEFICIENCY: ICD-10-CM

## 2023-03-23 DIAGNOSIS — G93.2 PSEUDOTUMOR CEREBRI SYNDROME: Primary | ICD-10-CM

## 2023-03-23 DIAGNOSIS — E66.01 OBESITY, CLASS III, BMI 40-49.9 (MORBID OBESITY) (HCC): ICD-10-CM

## 2023-03-23 RX ORDER — TOPIRAMATE 25 MG/1
100 TABLET ORAL DAILY
Qty: 120 TABLET | Refills: 3 | Status: SHIPPED | OUTPATIENT
Start: 2023-04-17 | End: 2023-05-17

## 2023-03-23 RX ORDER — TOPIRAMATE 25 MG/1
TABLET ORAL
Qty: 42 TABLET | Refills: 0 | Status: SHIPPED | OUTPATIENT
Start: 2023-03-23 | End: 2023-04-20

## 2023-03-23 RX ORDER — LANOLIN ALCOHOL/MO/W.PET/CERES
400 CREAM (GRAM) TOPICAL DAILY
Qty: 30 TABLET | Refills: 12 | Status: SHIPPED | OUTPATIENT
Start: 2023-03-23 | End: 2023-04-22

## 2023-03-23 ASSESSMENT — ENCOUNTER SYMPTOMS
COUGH: 0
VOMITING: 0
ABDOMINAL PAIN: 0
SHORTNESS OF BREATH: 0
SINUS PAIN: 0
NAUSEA: 0
CHEST TIGHTNESS: 0
APNEA: 0
ABDOMINAL DISTENTION: 0
EYE PAIN: 0

## 2023-03-23 ASSESSMENT — VISUAL ACUITY: VA_NORMAL: 1

## 2023-03-23 NOTE — PROGRESS NOTES
Not on file   Tobacco Use    Smoking status: Never    Smokeless tobacco: Never   Substance and Sexual Activity    Alcohol use: Yes    Drug use: No    Sexual activity: Yes     Birth control/protection: Injection   Other Topics Concern    Not on file   Social History Narrative    She is a senior in high school. She hopes to attend college next year. She plays the [employer] in band. Social Determinants of Health     Financial Resource Strain: Low Risk     Difficulty of Paying Living Expenses: Not hard at all   Food Insecurity: No Food Insecurity    Worried About Running Out of Food in the Last Year: Never true    Ran Out of Food in the Last Year: Never true   Transportation Needs: Not on file   Physical Activity: Not on file   Stress: Not on file   Social Connections: Not on file   Intimate Partner Violence: Not on file   Housing Stability: Not on file        Current Outpatient Medications   Medication Sig Dispense Refill    magnesium oxide (MAG-OX) 400 (240 Mg) MG tablet Take 1 tablet by mouth daily 30 tablet 12    topiramate (TOPAMAX) 25 MG tablet Take 1 tablet by mouth nightly for 14 days, THEN 2 tablets nightly for 14 days. 42 tablet 0    [START ON 4/17/2023] topiramate (TOPAMAX) 25 MG tablet Take 4 tablets by mouth daily 120 tablet 3    acetaZOLAMIDE (DIAMOX) 250 MG tablet Take 1 tablet by mouth 2 times daily 90 tablet 3    butalbital-acetaminophen-caffeine (FIORICET, ESGIC) -40 MG per tablet Take 1 tablet by mouth every 6 hours as needed for Headaches (Patient not taking: No sig reported) 30 tablet 0    medroxyPROGESTERone (DEPO-PROVERA) 150 MG/ML injection 1 mL (Patient not taking: No sig reported)       No current facility-administered medications for this visit. No Known Allergies     REVIEW OF SYSTEMS:     Review of Systems   Constitutional:  Negative for activity change, appetite change, chills and fatigue. HENT:  Negative for ear pain and sinus pain.     Eyes:  Negative for pain and

## 2023-03-23 NOTE — PATIENT INSTRUCTIONS
Start taking topamax 25mg nightly for 14 days then increase to 50mg nightly for 14  Can increase topamax after 1 month to 50mg BID if needed or maintain 50mg nightly   Follow up with ophtho and fax that visit to our office  Follow up with me in 1-2 months

## 2023-05-25 ENCOUNTER — HOSPITAL ENCOUNTER (OUTPATIENT)
Age: 23
Discharge: HOME | End: 2023-05-25

## 2023-05-25 DIAGNOSIS — E66.01 OBESITY, CLASS III, BMI 40-49.9 (MORBID OBESITY) (HCC): ICD-10-CM

## 2023-05-25 DIAGNOSIS — G93.2 PSEUDOTUMOR CEREBRI SYNDROME: ICD-10-CM

## 2023-05-25 DIAGNOSIS — E55.9 VITAMIN D DEFICIENCY: ICD-10-CM

## 2023-05-25 LAB
25(OH)D3 SERPL-MCNC: 18.4 NG/ML
ALBUMIN SERPL-MCNC: 4.1 G/DL (ref 3.5–5.2)
ALBUMIN/GLOB SERPL: 1.2 {RATIO} (ref 1–2.5)
ALP SERPL-CCNC: 89 U/L (ref 35–104)
ALT SERPL-CCNC: 21 U/L (ref 5–33)
ANION GAP SERPL CALCULATED.3IONS-SCNC: 12 MMOL/L (ref 9–17)
AST SERPL-CCNC: 22 U/L
BILIRUB SERPL-MCNC: 0.4 MG/DL (ref 0.3–1.2)
BUN SERPL-MCNC: 10 MG/DL (ref 6–20)
CALCIUM SERPL-MCNC: 9.3 MG/DL (ref 8.6–10.4)
CHLORIDE SERPL-SCNC: 105 MMOL/L (ref 98–107)
CO2 SERPL-SCNC: 23 MMOL/L (ref 20–31)
CREAT SERPL-MCNC: 0.58 MG/DL (ref 0.5–0.9)
GFR SERPL CREATININE-BSD FRML MDRD: >60 ML/MIN/1.73M2
GLUCOSE SERPL-MCNC: 107 MG/DL (ref 70–99)
POTASSIUM SERPL-SCNC: 3.9 MMOL/L (ref 3.7–5.3)
PROT SERPL-MCNC: 7.6 G/DL (ref 6.4–8.3)
SODIUM SERPL-SCNC: 140 MMOL/L (ref 135–144)

## 2023-05-25 PROCEDURE — 82306 VITAMIN D 25 HYDROXY: CPT

## 2023-05-25 PROCEDURE — 80053 COMPREHEN METABOLIC PANEL: CPT

## 2023-12-07 ENCOUNTER — OFFICE VISIT (OUTPATIENT)
Dept: NEUROLOGY | Age: 23
End: 2023-12-07

## 2023-12-07 VITALS
HEART RATE: 80 BPM | WEIGHT: 268.4 LBS | DIASTOLIC BLOOD PRESSURE: 83 MMHG | BODY MASS INDEX: 43.13 KG/M2 | SYSTOLIC BLOOD PRESSURE: 132 MMHG | HEIGHT: 66 IN

## 2023-12-07 DIAGNOSIS — E66.01 OBESITY, CLASS III, BMI 40-49.9 (MORBID OBESITY) (HCC): ICD-10-CM

## 2023-12-07 DIAGNOSIS — G43.001 MIGRAINE WITHOUT AURA AND WITH STATUS MIGRAINOSUS, NOT INTRACTABLE: ICD-10-CM

## 2023-12-07 DIAGNOSIS — G93.2 PSEUDOTUMOR CEREBRI SYNDROME: Primary | ICD-10-CM

## 2023-12-07 PROCEDURE — 99214 OFFICE O/P EST MOD 30 MIN: CPT | Performed by: STUDENT IN AN ORGANIZED HEALTH CARE EDUCATION/TRAINING PROGRAM

## 2023-12-07 ASSESSMENT — ENCOUNTER SYMPTOMS
ABDOMINAL DISTENTION: 0
VOMITING: 0
ABDOMINAL PAIN: 0
EYE PAIN: 0
SHORTNESS OF BREATH: 0
APNEA: 0
SINUS PAIN: 0
CHEST TIGHTNESS: 0
NAUSEA: 0
COUGH: 0

## 2023-12-07 ASSESSMENT — VISUAL ACUITY: VA_NORMAL: 1

## 2023-12-07 NOTE — PROGRESS NOTES
[address] [address], [address], [address]  P: [phone]  F: [phone]    NEUROLOGY CLINIC NOTE     PATIENT NAME: Anselmo Farooq  PATIENT MRN: [id number]  PRIMARY CARE PHYSICIAN: Shira Gee MD      Interval outpatient follow up 12/7/2023     Anselmo Farooq is a 24 y.o. female who presents to clinic today as routine follow up for her history of chronic headaches secondary to [address]. Patient reports that her last headache was 3 weeks ago. Headaches are rare and occur 1-2 times a month for which she takes Excedrin migraine with relief. Headaches last ~ 1 hr, are both throbbing and pressure like in nature, right-sided, and associated with photophobia, no N/V, phonophobia. On Topamax 50 mg bid. She saw ophthalmology in November, records not available in chart. Interval outpatient follow up 6/1/2023     Anselmo Farooq is a 24 y.o. female who presents to clinic today as routine follow up for her history of chronic headaches secondary to IIH. Patient states that she did see her opthomologist April 12th who evaluated her for this and I did review his notes and fundus exam did not show any signs of papiledema or increased ICP. She states that he did discuss with her weight loss and concern for weight gain and that he felt she should start a low dose of diamox in the interim. Patient has been having occasional headaches that are aborted with excedrin about 1-2 per week. Unfortunatly she ran out of topamax in April so has been off for about 1 month. Otherwise denies any other questions or concerns and has been doing well on topamax 50mg BID off diamox. Interval Clinic follow up 3/23/23:       Anselmo Farooq is a 20 y.o. female who presents to clinic today as routine follow up for history of Pseudotumor Cerebri. Since her last visit patient states she has been doing well.  Complains of roughly 1-3 headaches per week however can come in

## 2024-09-25 ENCOUNTER — TELEPHONE (OUTPATIENT)
Dept: FAMILY MEDICINE CLINIC | Age: 24
End: 2024-09-25

## 2024-09-26 ENCOUNTER — OFFICE VISIT (OUTPATIENT)
Dept: NEUROLOGY | Age: 24
End: 2024-09-26
Payer: COMMERCIAL

## 2024-09-26 VITALS
WEIGHT: 252.3 LBS | SYSTOLIC BLOOD PRESSURE: 130 MMHG | HEIGHT: 66 IN | HEART RATE: 76 BPM | DIASTOLIC BLOOD PRESSURE: 77 MMHG | BODY MASS INDEX: 40.55 KG/M2

## 2024-09-26 DIAGNOSIS — G93.2 PSEUDOTUMOR CEREBRI SYNDROME: Primary | ICD-10-CM

## 2024-09-26 PROCEDURE — 99214 OFFICE O/P EST MOD 30 MIN: CPT

## 2024-09-26 RX ORDER — TIMOLOL MALEATE 2.5 MG/ML
1 SOLUTION/ DROPS OPHTHALMIC 2 TIMES DAILY
COMMUNITY

## 2024-09-26 RX ORDER — LATANOPROST 50 UG/ML
1 SOLUTION/ DROPS OPHTHALMIC NIGHTLY
COMMUNITY

## 2024-09-26 NOTE — PROGRESS NOTES
[address] [facility] [address], [address]  [place] [address]  P: [phone]  F: [phone]    NEUROLOGY CLINIC NOTE     PATIENT NAME: Layla Wilkinson  PATIENT MRN: [id number]  PRIMARY CARE PHYSICIAN: Amauri Goldstein MD    Interval outpatient follow up 9/26/2024     Patient presents to the neurology clinic for follow up of IIH. Patient was recently evaluated by ophthalmology who voiced concern for increasing papilledema. Unfortunately, we do not have access to most recent ophthalmology note. Per the patient, he had recommended repeat LP.  Patient states that she has only had 2 headaches in the past year and denies any current or worsening headaches at this time.  Denies any vision changes.  She is currently on Topamax 50 mg twice daily.  She did have some tingling in her fingers while on the Diamox previously.    Interval outpatient follow up 12/7/2023     Layla Wilkinson is a 23 y.o. female who presents to clinic today as routine follow up for her history of chronic headaches secondary to IIH. Patient reports that her last headache was 3 weeks ago. Headaches are rare and occur 1-2 times a month for which she takes Excedrin migraine with relief. Headaches last ~ 1 hr, are both throbbing and pressure like in nature, right-sided, and associated with photophobia, no N/V, phonophobia.  On Topamax 50 mg bid. She saw ophthalmology in November, records not available in chart.     Interval outpatient follow up 6/1/2023     Layla Wilkinson is a 23 y.o. female who presents to clinic today as routine follow up for her history of chronic headaches secondary to IIH. Patient states that she did see her opthomologist April 12th who evaluated her for this and I did review his notes and fundus exam did not show any signs of papiledema or increased ICP. She states that he did discuss with her weight loss and concern for weight gain and that he felt she should start a low dose of

## 2024-10-02 ENCOUNTER — HOSPITAL ENCOUNTER (OUTPATIENT)
Dept: INTERVENTIONAL RADIOLOGY/VASCULAR | Age: 24
Discharge: HOME | End: 2024-10-04
Payer: COMMERCIAL

## 2024-10-02 VITALS
RESPIRATION RATE: 16 BRPM | OXYGEN SATURATION: 98 % | HEIGHT: 66 IN | TEMPERATURE: 97.5 F | BODY MASS INDEX: 40.5 KG/M2 | DIASTOLIC BLOOD PRESSURE: 76 MMHG | WEIGHT: 252 LBS | HEART RATE: 68 BPM | SYSTOLIC BLOOD PRESSURE: 116 MMHG

## 2024-10-02 DIAGNOSIS — G93.2 PSEUDOTUMOR CEREBRI SYNDROME: ICD-10-CM

## 2024-10-02 PROCEDURE — 62328 DX LMBR SPI PNXR W/FLUOR/CT: CPT

## 2024-10-02 RX ORDER — SODIUM CHLORIDE 9 MG/ML
INJECTION, SOLUTION INTRAVENOUS CONTINUOUS
Status: DISCONTINUED | OUTPATIENT
Start: 2024-10-02 | End: 2024-10-05 | Stop reason: HOSPADM

## 2024-10-02 ASSESSMENT — PAIN - FUNCTIONAL ASSESSMENT
PAIN_FUNCTIONAL_ASSESSMENT: NONE - DENIES PAIN
PAIN_FUNCTIONAL_ASSESSMENT: NONE - DENIES PAIN

## 2024-10-02 NOTE — BRIEF OP NOTE
Brief Postoperative Note Lumbar Puncture    Layla Wilkinson  YOB: 2000  [id number]    Pre-operative Diagnosis: IIH    Post-operative Diagnosis: Same    Procedure: Fluoro guided Lumbar Puncture    Anesthesia: 1% Lidocaine    Surgeons/Assistants: Johana Marcus PA-C and Dr. Arriaga    Complications: None    EBL: Minimal    Specimens: none    Fluoroscopy guided lumbar puncture.Opening pressure was 26 cm H2O.  Dressing applied. Instructions given.    Electronically signed by Johana Marcus PA-C on 10/2/2024 at 8:37 AM

## 2024-10-02 NOTE — H&P
History and Physical    Pt Name: Layla Wilkinson  MRN: [id number]  YOB: 2000  Date of evaluation: 10/2/2024  Primary Care Physician: Amauri Goldstein MD    SUBJECTIVE:   History of Chief Complaint:    Layla Wilkinson is a 23 y.o. female who is scheduled today for IR LUMBAR PUNCTURE FOR DIAGNOSIS. Patient reports she has been having occasional headaches for years. She denies any dizziness, blurred vision, weakness, imbalance or memory problems. Patient states she follows with neurophthalmology and states her pressured to her eyes has increased. She has been diagnosed with pseudotumor cerebri syndrome.   Allergies  has No Known Allergies.  Medications  Prior to Admission medications    Medication Sig Start Date End Date Taking? Authorizing Provider   latanoprost (XALATAN) 0.005 % ophthalmic solution Place 1 drop into both eyes nightly 1 drop in OU nightly   Yes Cordelia Yeboah MD   timolol (TIMOPTIC) 0.25 % ophthalmic solution Place 1 drop into both eyes 2 times daily 1 drop OU BID   Yes Cordelia Yeboah MD   topiramate (TOPAMAX) 50 MG tablet Take 1 tablet by mouth 2 times daily 6/1/23  Yes Tushar Turner MD   magnesium oxide (MAG-OX) 400 (240 Mg) MG tablet Take 1 tablet by mouth daily  Patient not taking: Reported on 9/26/2024 3/23/23 12/7/23  Tushar Turner MD   topiramate (TOPAMAX) 25 MG tablet Take 1 tablet by mouth nightly for 14 days, THEN 2 tablets nightly for 14 days.  Patient not taking: Reported on 12/7/2023 3/23/23 12/7/23  Tushar Turner MD   butalbital-acetaminophen-caffeine (FIORICET, ESGIC) -40 MG per tablet Take 1 tablet by mouth every 6 hours as needed for Headaches  Patient not taking: Reported on 11/30/2021 7/30/21   Oskar Quesada MD   medroxyPROGESTERone (DEPO-PROVERA) 150 MG/ML injection 1 mL  Patient not taking: Reported on 11/22/2022    Cordelia Yeboah MD     Past Medical History    has a past medical history of Cholecystitis, IIH (idiopathic intracranial

## 2024-10-02 NOTE — DISCHARGE INSTRUCTIONS
ANY FURTHER QUESTIONS OR CONCERNS REGARDING THE PROCEDURE, PLEASE CONTACT THE IR DEPARTMENT -[phone].

## 2024-10-02 NOTE — PROGRESS NOTES
Pt arrives for LP for psudo tumor cerebri  Placed prone on table  KT RT to bedside    PS msg sent to Dr Quesada tp clarify if specimen needs sent to lab.  Only opening pressure at this time.     Site prepped and draped  Dr Arriaga to bedside  Access obtained  Opening pressure = 26cm of H20  Access removed and site covered with band aid  To Pre op

## 2024-10-03 PROBLEM — N39.44 NOCTURNAL ENURESIS: Status: ACTIVE | Noted: 2024-10-03

## 2024-10-03 PROBLEM — G96.810: Status: ACTIVE | Noted: 2021-08-30

## 2024-10-03 PROBLEM — K21.9 GASTROESOPHAGEAL REFLUX DISEASE: Status: ACTIVE | Noted: 2024-10-03

## 2024-10-03 PROBLEM — R55 SYNCOPAL VERTIGO: Status: ACTIVE | Noted: 2024-10-03

## 2024-10-03 PROBLEM — L25.9 CONTACT DERMATITIS: Status: ACTIVE | Noted: 2024-10-03

## 2024-10-03 PROBLEM — S00.33XA CONTUSION OF NOSE: Status: ACTIVE | Noted: 2024-10-03

## 2024-10-03 PROBLEM — H00.039 ABSCESS OF EYELID: Status: ACTIVE | Noted: 2024-10-03

## 2024-10-03 PROBLEM — J21.9 ACUTE BRONCHIOLITIS: Status: ACTIVE | Noted: 2024-10-03

## 2024-10-03 PROBLEM — R42 SYNCOPAL VERTIGO: Status: ACTIVE | Noted: 2024-10-03

## 2024-10-03 PROBLEM — H65.499 CHRONIC NONSUPPURATIVE OTITIS MEDIA: Status: ACTIVE | Noted: 2024-10-03

## 2024-10-03 PROBLEM — H10.9 CONJUNCTIVITIS: Status: ACTIVE | Noted: 2024-10-03

## 2024-10-03 PROBLEM — K52.9 ACUTE GASTROENTERITIS: Status: ACTIVE | Noted: 2024-10-03

## 2024-10-03 PROBLEM — S00.31XA ABRASION OF NOSE: Status: ACTIVE | Noted: 2024-10-03

## 2024-10-04 ENCOUNTER — TELEPHONE (OUTPATIENT)
Dept: NEUROLOGY | Age: 24
End: 2024-10-04

## 2024-10-04 ENCOUNTER — HOSPITAL ENCOUNTER (EMERGENCY)
Age: 24
Discharge: HOME | End: 2024-10-04
Attending: EMERGENCY MEDICINE
Payer: COMMERCIAL

## 2024-10-04 VITALS
HEART RATE: 73 BPM | WEIGHT: 260.8 LBS | HEIGHT: 66 IN | TEMPERATURE: 98.3 F | DIASTOLIC BLOOD PRESSURE: 101 MMHG | SYSTOLIC BLOOD PRESSURE: 148 MMHG | RESPIRATION RATE: 10 BRPM | BODY MASS INDEX: 41.91 KG/M2 | OXYGEN SATURATION: 98 %

## 2024-10-04 DIAGNOSIS — G97.1 POST LUMBAR PUNCTURE HEADACHE: Primary | ICD-10-CM

## 2024-10-04 DIAGNOSIS — G93.2 PSEUDOTUMOR CEREBRI: Primary | ICD-10-CM

## 2024-10-04 PROCEDURE — 96374 THER/PROPH/DIAG INJ IV PUSH: CPT

## 2024-10-04 PROCEDURE — 6370000000 HC RX 637 (ALT 250 FOR IP)

## 2024-10-04 PROCEDURE — 2580000003 HC RX 258

## 2024-10-04 PROCEDURE — 99284 EMERGENCY DEPT VISIT MOD MDM: CPT

## 2024-10-04 PROCEDURE — 6360000002 HC RX W HCPCS

## 2024-10-04 RX ORDER — PROCHLORPERAZINE EDISYLATE 5 MG/ML
10 INJECTION INTRAMUSCULAR; INTRAVENOUS ONCE
Status: COMPLETED | OUTPATIENT
Start: 2024-10-04 | End: 2024-10-04

## 2024-10-04 RX ORDER — 0.9 % SODIUM CHLORIDE 0.9 %
1000 INTRAVENOUS SOLUTION INTRAVENOUS ONCE
Status: COMPLETED | OUTPATIENT
Start: 2024-10-04 | End: 2024-10-04

## 2024-10-04 RX ORDER — DEXAMETHASONE SODIUM PHOSPHATE 10 MG/ML
10 INJECTION, SOLUTION INTRAMUSCULAR; INTRAVENOUS ONCE
Status: COMPLETED | OUTPATIENT
Start: 2024-10-04 | End: 2024-10-04

## 2024-10-04 RX ORDER — ACETAZOLAMIDE 250 MG/1
250 TABLET ORAL 2 TIMES DAILY
Qty: 90 TABLET | Refills: 3 | Status: SHIPPED | OUTPATIENT
Start: 2024-10-04

## 2024-10-04 RX ORDER — DIPHENHYDRAMINE HYDROCHLORIDE 50 MG/ML
25 INJECTION INTRAMUSCULAR; INTRAVENOUS ONCE
Status: COMPLETED | OUTPATIENT
Start: 2024-10-04 | End: 2024-10-04

## 2024-10-04 RX ORDER — ACETAMINOPHEN 500 MG
1000 TABLET ORAL ONCE
Status: COMPLETED | OUTPATIENT
Start: 2024-10-04 | End: 2024-10-04

## 2024-10-04 RX ADMIN — SODIUM CHLORIDE 1000 ML: 9 INJECTION, SOLUTION INTRAVENOUS at 13:43

## 2024-10-04 RX ADMIN — DEXAMETHASONE SODIUM PHOSPHATE 10 MG: 10 INJECTION INTRAMUSCULAR; INTRAVENOUS at 13:31

## 2024-10-04 RX ADMIN — DIPHENHYDRAMINE HYDROCHLORIDE 25 MG: 50 INJECTION INTRAMUSCULAR; INTRAVENOUS at 13:31

## 2024-10-04 RX ADMIN — ACETAMINOPHEN 1000 MG: 500 TABLET ORAL at 13:32

## 2024-10-04 RX ADMIN — PROCHLORPERAZINE EDISYLATE 10 MG: 5 INJECTION INTRAMUSCULAR; INTRAVENOUS at 13:31

## 2024-10-04 ASSESSMENT — LIFESTYLE VARIABLES
HOW OFTEN DO YOU HAVE A DRINK CONTAINING ALCOHOL: MONTHLY OR LESS
HOW MANY STANDARD DRINKS CONTAINING ALCOHOL DO YOU HAVE ON A TYPICAL DAY: 1 OR 2

## 2024-10-04 ASSESSMENT — ENCOUNTER SYMPTOMS
SHORTNESS OF BREATH: 0
ABDOMINAL PAIN: 0
BACK PAIN: 1
PHOTOPHOBIA: 0
VOMITING: 1
NAUSEA: 1

## 2024-10-04 ASSESSMENT — PAIN SCALES - GENERAL: PAINLEVEL_OUTOF10: 7

## 2024-10-04 ASSESSMENT — PAIN DESCRIPTION - LOCATION: LOCATION: HEAD;BACK

## 2024-10-04 NOTE — ED PROVIDER NOTES
[facility] ED  Emergency Department Encounter  Emergency Medicine Resident     Pt Name:Layla Wilkinson  MRN: [id number]  Birthdate 2000  Date of evaluation: 10/4/24  PCP:  Amauri Goldstein MD  Note Started: 1:25 PM EDT      CHIEF COMPLAINT       Chief Complaint   Patient presents with    Post-op Problem     Lumbar puncture 10/2    Emesis    Headache       HISTORY OF PRESENT ILLNESS  (Location/Symptom, Timing/Onset, Context/Setting, Quality, Duration, Modifying Factors, Severity.)      Layla Wilkinson is a 23 y.o. female who presents with headache.  Patient did have a lumbar puncture on 10/2 with an opening pressure of 26 cm of water.  Patient does have a history of IIH.  She denies any trauma to her head, denies any fever or chills, denies previous neurosurgical interventions, denies any neck pain or rash.  Patient denies any new weakness or numbness, denies any change in bladder/bowel function.    She states that the headache is worse when she stands up.  She does also endorse nausea and vomiting with the headache.  She states it started yesterday.    PAST MEDICAL / SURGICAL / SOCIAL / FAMILY HISTORY      has a past medical history of Cholecystitis, IIH (idiopathic intracranial hypertension), Migraine, Post lumbar puncture headache, and Pseudotumor cerebri syndrome.     has a past surgical history that includes Tympanostomy tube placement; Cholecystectomy, laparoscopic (09/22/2017); and Cholecystectomy, laparoscopic (N/A, 9/22/2017).    Social History     Socioeconomic History    Marital status: Single     Spouse name: Not on file    Number of children: Not on file    Years of education: Not on file    Highest education level: Not on file   Occupational History    Not on file   Tobacco Use    Smoking status: Never    Smokeless tobacco: Never   Vaping Use    Vaping status: Never Used   Substance and Sexual Activity    Alcohol use: Yes     Alcohol/week: 3.0 standard drinks of alcohol      hip flexion bilaterally  5/5 flexion extension at the elbows bilaterally  5/5 shoulder abduction and abduction bilaterally  Equal bilateral palmar grasp  Sensation grossly intact over all extremities         DDX/DIAGNOSTIC RESULTS / EMERGENCY DEPARTMENT COURSE / MDM     Medical Decision Making  23-year-old female presenting with postural headache following an LP 2 days ago.  Impression is most likely post LP headache.  Will give migraine cocktail.  Patient denies any new trauma, she is neuro intact.  Will defer neuroimaging at this time.  Patient also concerned about back pain.  I did examine the injection site, there is no wound, no evidence of infection or bleeding.  There is no tenderness to palpation over the midline.  No concern for abscess formation at this time.  Will symptomatically treat, if patient unresponsive to migraine cocktail, will discuss IV caffeine, sphenopalatine block, possible blood patch with anesthesia.    Risk  OTC drugs.  Prescription drug management.      EMERGENCY DEPARTMENT COURSE:  ED Course as of 10/04/24 1644   Fri Oct 04, 2024   1318 BP(!): 148/101 [KJ]   1318 Pulse: 73 [KJ]   1318 Respirations: 10 [KJ]   1318 Temp: 98.3 °F (36.8 °C) [KJ]   1318 SpO2: 98 % [KJ]   1520 Patient reevaluated, states that her headache is greatly improved.  States that she does have the Diamox Rx ready for pickup at her pharmacy.  She states that she will take this, the Excedrin, and the Topamax.  She was discharged with return precautions. [KJ]      ED Course User Index  [KJ] Peter Ferrari MD     IMAGING:  No orders to display     MEDICATIONS GIVEN TO PATIENT THIS ENCOUNTER:  Orders Placed This Encounter   Medications    prochlorperazine (COMPAZINE) injection 10 mg    diphenhydrAMINE (BENADRYL) injection 25 mg    acetaminophen (TYLENOL) tablet 1,000 mg    dexAMETHasone (PF) (DECADRON) injection 10 mg    sodium chloride 0.9 % bolus 1,000 mL     PROCEDURES:  Procedures     CONSULTS:  None    CRITICAL

## 2024-10-04 NOTE — PROGRESS NOTES
Opening pressure elevated on LP, 26 cmH2O. Patient called complaining of headaches and vomiting. Prescription for Diamox sent.

## 2024-10-04 NOTE — TELEPHONE ENCOUNTER
I epic chatted Dr. Quesada concerning this and he stated her opening pressure is high again and he will send a script of Diamox to the pt pharmacy. I let pt know script was sent to her pharmacy.

## 2024-10-04 NOTE — DISCHARGE INSTRUCTIONS
You were seen in the emergency department for a post lumbar puncture headache.  This did improve with a migraine cocktail.    Return to the emergency department if you have any worsening headache, fever/chills, nausea/vomiting, vision changes, lightheadedness, new weakness or numbness, or any other acute medical concern.    Schedule appointment to be seen by your primary care doctor or neurologist as soon as possible for further follow-up.

## 2024-10-04 NOTE — ED PROVIDER NOTES
[facility]  Emergency Department  Faculty Attestation     I performed a history and physical examination of the patient and discussed management with the resident. I reviewed the resident’s note and agree with the documented findings and plan of care. Any areas of disagreement are noted on the chart. I was personally present for the key portions of any procedures. I have documented in the chart those procedures where I was not present during the key portions. I have reviewed the emergency nurses triage note. I agree with the chief complaint, past medical history, past surgical history, allergies, medications, social and family history as documented unless otherwise noted below.    For Physician Assistant/ Nurse Practitioner cases/documentation I have personally evaluated this patient and have completed at least one if not all key elements of the E/M (history, physical exam, and MDM). Additional findings are as noted.    Preliminary note started at 1:05 PM EDT    Primary Care Physician:  Amauri Goldstein MD    Screenings:  [unfilled]    CHIEF COMPLAINT       Chief Complaint   Patient presents with    Post-op Problem     Lumbar puncture 10/2    Emesis    Headache       RECENT VITALS:   BP (!) 148/101   Pulse 73   Temp 98.3 °F (36.8 °C) (Oral)   Resp 10   Ht 1.676 m (5' 6\")   Wt 118.3 kg (260 lb 12.9 oz)   SpO2 98%   BMI 42.09 kg/m²     LABS:  Labs Reviewed - No data to display    Radiology  No orders to display       Attending Physician Additional  Notes    Patient has pseudotumor cerebri, had LP 10/2/2024 with elevated opening pressures of approximate 26, closed at 19.  Since then she has been having symptoms when she is upright especially with standing where she has headache nausea vomiting.  When she is supine she is much better.  She also now has elevated intraocular pressure and is being treated for glaucoma.  She previously had been on Diamox for IIH but this has

## 2024-10-04 NOTE — ED TRIAGE NOTES
Pt presented to ED 23 via triage  Pt presents with C/O nausea and vomiting post LP.  Pt states she had an LP done Wednesday for an increased eye pressure. Pt states since yesterday she has been nauseated and vomiting, with headaches and light sensitivity. Pt states this has happened before when she had an LP. Pt states she called her neurologist and was told to come to the ER for a potential Blood patch.   Pt denies SOB, CP, dizziness, vision changes.  Pt has a hx of a pseudotumor cerebri.   Pt is Alert and oriented. Pt is resting comfortably on stretcher with call light in reach.  No acute distress noted. Respirations are even and unlabored.  White board updated. Will continue to follow plan of care.

## 2024-10-04 NOTE — TELEPHONE ENCOUNTER
PT called and she stated since she did a lumbar puncture that she has been vomiting and having headaches. Please advise?

## 2024-10-16 ENCOUNTER — OFFICE VISIT (OUTPATIENT)
Dept: OBGYN CLINIC | Age: 24
End: 2024-10-16
Payer: COMMERCIAL

## 2024-10-16 ENCOUNTER — HOSPITAL ENCOUNTER (OUTPATIENT)
Age: 24
Setting detail: SPECIMEN
Discharge: HOME | End: 2024-10-16

## 2024-10-16 VITALS
BODY MASS INDEX: 41.95 KG/M2 | DIASTOLIC BLOOD PRESSURE: 72 MMHG | HEIGHT: 66 IN | SYSTOLIC BLOOD PRESSURE: 120 MMHG | WEIGHT: 261 LBS

## 2024-10-16 DIAGNOSIS — Z01.419 WELL WOMAN EXAM WITH ROUTINE GYNECOLOGICAL EXAM: Primary | ICD-10-CM

## 2024-10-16 DIAGNOSIS — N92.1 MENORRHAGIA WITH IRREGULAR CYCLE: ICD-10-CM

## 2024-10-16 DIAGNOSIS — N76.0 ACUTE VAGINITIS: ICD-10-CM

## 2024-10-16 DIAGNOSIS — Z83.3 FAMILY HISTORY OF DIABETES MELLITUS: ICD-10-CM

## 2024-10-16 DIAGNOSIS — N89.8 VAGINAL ODOR: ICD-10-CM

## 2024-10-16 LAB
CANDIDA SPECIES: NEGATIVE
GARDNERELLA VAGINALIS: POSITIVE
SOURCE: ABNORMAL
TRICHOMONAS: NEGATIVE

## 2024-10-16 PROCEDURE — 99395 PREV VISIT EST AGE 18-39: CPT | Performed by: NURSE PRACTITIONER

## 2024-10-16 PROCEDURE — 99213 OFFICE O/P EST LOW 20 MIN: CPT | Performed by: NURSE PRACTITIONER

## 2024-10-16 ASSESSMENT — PATIENT HEALTH QUESTIONNAIRE - PHQ9
1. LITTLE INTEREST OR PLEASURE IN DOING THINGS: NOT AT ALL
SUM OF ALL RESPONSES TO PHQ QUESTIONS 1-9: 0
SUM OF ALL RESPONSES TO PHQ9 QUESTIONS 1 & 2: 0
2. FEELING DOWN, DEPRESSED OR HOPELESS: NOT AT ALL
SUM OF ALL RESPONSES TO PHQ QUESTIONS 1-9: 0

## 2024-10-16 NOTE — PROGRESS NOTES
History:  09/22/2017: CHOLECYSTECTOMY, LAPAROSCOPIC  9/22/2017: CHOLECYSTECTOMY, LAPAROSCOPIC; N/A      Comment:  CHOLECYSTECTOMY LAPAROSCOPIC, POSSIBLE COMMON BILE DUCT                EXPLORATION, POSSIBLE IOC, **SHORT STAY** performed by                Red Fraser MD at [facility] OR  No date: TYMPANOSTOMY TUBE PLACEMENT      Social History    Tobacco Use      Smoking status: Never      Smokeless tobacco: Never       Standing Status:   Future     Standing Expiration Date:   10/14/2025     Order Specific Question:   Collection Type     Answer:   Thin Prep     Order Specific Question:   Prior Abnormal Pap Test     Answer:   Yes     Comments:   ASCUS     Order Specific Question:   If Prior Abnormal, Give Date     Answer:   4/13/21     Order Specific Question:   Prior Treatment     Answer:   None Given     Order Specific Question:   Screening or Diagnostic     Answer:   Screening     Order Specific Question:   HPV Requested?     Answer:   N/A     Order Specific Question:   High Risk Patient     Answer:   N/A    CBC with Auto Differential     Standing Status:   Future     Standing Expiration Date:   10/16/2025    TSH with Reflex     Standing Status:   Future     Standing Expiration Date:   10/16/2025    HCG, Quantitative, Pregnancy     Standing Status:   Future     Standing Expiration Date:   10/16/2025    Protime-INR     Standing Status:   Future     Standing Expiration Date:   10/16/2025     Order Specific Question:   Daily Coumadin Dose?     Answer:   N    APTT     Standing Status:   Future     Standing Expiration Date:   10/16/2025     Order Specific Question:   Daily Heparin Dose?     Answer:   N    Follicle Stimulating Hormone     Standing Status:   Future     Standing Expiration Date:   10/16/2025    Luteinizing Hormone     Standing Status:   Future     Standing Expiration Date:   10/16/2025    Glucose, Fasting     Standing Status:   Future     Standing Expiration Date:   10/16/2025    Insulin, total     Patient

## 2024-10-17 ENCOUNTER — TELEPHONE (OUTPATIENT)
Dept: OBGYN CLINIC | Age: 24
End: 2024-10-17

## 2024-10-17 LAB
C TRACH DNA SPEC QL PROBE+SIG AMP: NEGATIVE
N GONORRHOEA DNA SPEC QL PROBE+SIG AMP: NEGATIVE
SPECIMEN DESCRIPTION: NORMAL

## 2024-10-17 RX ORDER — METRONIDAZOLE 500 MG/1
500 TABLET ORAL 2 TIMES DAILY
Qty: 14 TABLET | Refills: 0 | Status: SHIPPED | OUTPATIENT
Start: 2024-10-17 | End: 2024-10-24

## 2024-10-17 NOTE — TELEPHONE ENCOUNTER
[facility] Result Note Patient Contact Communication   [address] [place] [address]      10/17/24   10:57 AM     Patients Name: Layla Wilkinson   Medical Record Number: [id number]   Contact Serial Number: [id number]  YOB: 2000       Patients Phone Number: [phone]     Description of Recommendations:  The patient was contacted and her identity was confirmed with two of the specific identifiers listed above.  The information regarding her recent testing results and provider recommendations were reviewed with her in detail and all questions were answered.   Recent labs/Cultures/ Imaging Studies/Pathology reports and Urinalysis results are included:      Recommendations given by provider:  + BV  Flagyl 500mg PO BID x 7 days        The patient verbalized understanding of the information above and the recommendation by the provider. She will contact her PCP if any other questions arise or contact our office for any other clarifications.        Electronically signed by Jessica Bernardo MA on 10/17/2024 at 10:57 AM

## 2024-10-17 NOTE — TELEPHONE ENCOUNTER
----- Message from DARIAN Snow NP sent at 10/17/2024  9:11 AM EDT -----  + BV  Flagyl 500mg PO BID x 7 days

## 2024-10-20 LAB — CYTOLOGY REPORT: NORMAL

## 2024-11-12 ENCOUNTER — HOSPITAL ENCOUNTER (OUTPATIENT)
Age: 24
Discharge: HOME | End: 2024-11-12
Payer: COMMERCIAL

## 2024-11-12 DIAGNOSIS — N92.1 MENORRHAGIA WITH IRREGULAR CYCLE: ICD-10-CM

## 2024-11-12 DIAGNOSIS — Z83.3 FAMILY HISTORY OF DIABETES MELLITUS: ICD-10-CM

## 2024-11-12 LAB
ALT SERPL-CCNC: 21 U/L (ref 10–35)
AST SERPL-CCNC: 33 U/L (ref 10–35)
B-HCG SERPL EIA 3RD IS-ACNC: <0.2 MIU/ML
BASOPHILS # BLD: 0.03 K/UL (ref 0–0.2)
BASOPHILS NFR BLD: 0 % (ref 0–2)
BUN SERPL-MCNC: 9 MG/DL (ref 6–20)
CREAT SERPL-MCNC: 0.7 MG/DL (ref 0.6–0.9)
EOSINOPHIL # BLD: 0.2 K/UL (ref 0–0.44)
EOSINOPHILS RELATIVE PERCENT: 3 % (ref 1–4)
ERYTHROCYTE [DISTWIDTH] IN BLOOD BY AUTOMATED COUNT: 12.4 % (ref 11.8–14.4)
EST. AVERAGE GLUCOSE BLD GHB EST-MCNC: 88 MG/DL
FSH SERPL-ACNC: 7.2 MIU/ML
GFR, ESTIMATED: >90 ML/MIN/1.73M2
GLUCOSE P FAST SERPL-MCNC: 96 MG/DL (ref 74–99)
HBA1C MFR BLD: 4.7 % (ref 4–6)
HCT VFR BLD AUTO: 41.6 % (ref 36.3–47.1)
HGB BLD-MCNC: 13.9 G/DL (ref 11.9–15.1)
IMM GRANULOCYTES # BLD AUTO: <0.03 K/UL (ref 0–0.3)
IMM GRANULOCYTES NFR BLD: 0 %
INR PPP: 1
INSULIN REFERENCE RANGE:: NORMAL
INSULIN: 2.7 MU/L
LH SERPL-ACNC: 3.9 MIU/ML (ref 1.7–8.6)
LYMPHOCYTES NFR BLD: 2.07 K/UL (ref 1.1–3.7)
LYMPHOCYTES RELATIVE PERCENT: 28 % (ref 24–43)
MCH RBC QN AUTO: 28.5 PG (ref 25.2–33.5)
MCHC RBC AUTO-ENTMCNC: 33.4 G/DL (ref 28.4–34.8)
MCV RBC AUTO: 85.4 FL (ref 82.6–102.9)
MONOCYTES NFR BLD: 0.47 K/UL (ref 0.1–1.2)
MONOCYTES NFR BLD: 6 % (ref 3–12)
NEUTROPHILS NFR BLD: 62 % (ref 36–65)
NEUTS SEG NFR BLD: 4.51 K/UL (ref 1.5–8.1)
NRBC BLD-RTO: 0 PER 100 WBC
PARTIAL THROMBOPLASTIN TIME: <20 SEC (ref 23–36.5)
PLATELET # BLD AUTO: NORMAL K/UL (ref 138–453)
PLATELET, FLUORESCENCE: NORMAL K/UL (ref 138–453)
PROTHROMBIN TIME: 12.6 SEC (ref 11.7–14.9)
RBC # BLD AUTO: 4.87 M/UL (ref 3.95–5.11)
TSH SERPL DL<=0.05 MIU/L-ACNC: 2 UIU/ML (ref 0.27–4.2)
WBC OTHER # BLD: 7.3 K/UL (ref 3.5–11.3)

## 2024-11-12 PROCEDURE — 36415 COLL VENOUS BLD VENIPUNCTURE: CPT

## 2024-11-12 PROCEDURE — 84443 ASSAY THYROID STIM HORMONE: CPT

## 2024-11-12 PROCEDURE — 84450 TRANSFERASE (AST) (SGOT): CPT

## 2024-11-12 PROCEDURE — 84702 CHORIONIC GONADOTROPIN TEST: CPT

## 2024-11-12 PROCEDURE — 85610 PROTHROMBIN TIME: CPT

## 2024-11-12 PROCEDURE — 85025 COMPLETE CBC W/AUTO DIFF WBC: CPT

## 2024-11-12 PROCEDURE — 83036 HEMOGLOBIN GLYCOSYLATED A1C: CPT

## 2024-11-12 PROCEDURE — 82947 ASSAY GLUCOSE BLOOD QUANT: CPT

## 2024-11-12 PROCEDURE — 83002 ASSAY OF GONADOTROPIN (LH): CPT

## 2024-11-12 PROCEDURE — 83525 ASSAY OF INSULIN: CPT

## 2024-11-12 PROCEDURE — 84520 ASSAY OF UREA NITROGEN: CPT

## 2024-11-12 PROCEDURE — 83001 ASSAY OF GONADOTROPIN (FSH): CPT

## 2024-11-12 PROCEDURE — 85055 RETICULATED PLATELET ASSAY: CPT

## 2024-11-12 PROCEDURE — 85730 THROMBOPLASTIN TIME PARTIAL: CPT

## 2024-11-12 PROCEDURE — 84460 ALANINE AMINO (ALT) (SGPT): CPT

## 2024-11-12 PROCEDURE — 82565 ASSAY OF CREATININE: CPT

## 2024-11-13 ENCOUNTER — TELEPHONE (OUTPATIENT)
Dept: OBGYN CLINIC | Age: 24
End: 2024-11-13

## 2024-11-13 DIAGNOSIS — R79.1 ABNORMAL PARTIAL THROMBOPLASTIN TIME (PTT): ICD-10-CM

## 2024-11-13 DIAGNOSIS — N92.1 MENORRHAGIA WITH IRREGULAR CYCLE: Primary | ICD-10-CM

## 2024-11-13 NOTE — TELEPHONE ENCOUNTER
[facility] Result Note Patient Contact Communication   [address] [place] [address]      11/13/24   9:54 AM     Patients Name: Layla Wilkinson   Medical Record Number: [id number]   Contact Serial Number: [id number]  YOB: 2000       Patients Phone Number: [phone]     Description of Recommendations:  The patient was contacted and her identity was confirmed with two of the specific identifiers listed above.  The information regarding her recent testing results and provider recommendations were reviewed with her in detail and all questions were answered.   Recent labs/Cultures/ Imaging Studies/Pathology reports and Urinalysis results are included:      Recommendations given by provider:  PTT is low.   Repeat PTT in 4 weeks    The patient verbalized understanding of the information above and the recommendation by the provider. She will contact her PCP if any other questions arise or contact our office for any other clarifications.        Electronically signed by Jessica Bernardo MA on 11/13/2024 at 9:54 AM

## 2024-11-13 NOTE — TELEPHONE ENCOUNTER
----- Message from DARIAN Bullock CNP sent at 11/12/2024  2:07 PM EST -----  PTT is low.   Repeat PTT in 4 weeks

## 2024-11-15 ENCOUNTER — TELEMEDICINE (OUTPATIENT)
Dept: OBGYN CLINIC | Age: 24
End: 2024-11-15

## 2024-11-15 DIAGNOSIS — N92.6 IRREGULAR MENSES: ICD-10-CM

## 2024-11-15 DIAGNOSIS — Z83.3 FAMILY HISTORY OF DIABETES MELLITUS: Primary | ICD-10-CM

## 2024-11-15 RX ORDER — METFORMIN HYDROCHLORIDE 500 MG/1
500 TABLET, EXTENDED RELEASE ORAL 2 TIMES DAILY
Qty: 60 TABLET | Refills: 5 | Status: SHIPPED | OUTPATIENT
Start: 2024-11-15 | End: 2025-05-14

## 2024-11-15 NOTE — PROGRESS NOTES
hCG Quant 11/12/2024 <0.2  mIU/mL Final    Comment:    Non-preg premeno   <=5  Postmeno           <=8  Male               <=3  If HCG results do not concur with clinical observations, additional testing to confirm   results is recommended.      TSH 11/12/2024 2.00  0.27 - 4.20 uIU/mL Final    WBC 11/12/2024 7.3  3.5 - 11.3 k/uL Final    RBC 11/12/2024 4.87  3.95 - 5.11 m/uL Final    Hemoglobin 11/12/2024 13.9  11.9 - 15.1 g/dL Final    Hematocrit 11/12/2024 41.6  36.3 - 47.1 % Final    MCV 11/12/2024 85.4  82.6 - 102.9 fL Final    MCH 11/12/2024 28.5  25.2 - 33.5 pg Final    MCHC 11/12/2024 33.4  28.4 - 34.8 g/dL Final    RDW 11/12/2024 12.4  11.8 - 14.4 % Final    Platelets 11/12/2024 See Reflexed IPF Result  138 - 453 k/uL Final    Platelet, Fluorescence 11/12/2024 Platelet clumps present, count appears adequate.  138 - 453 k/uL Final    NRBC Automated 11/12/2024 0.0  0.0 per 100 WBC Final    Neutrophils % 11/12/2024 62  36 - 65 % Final    Lymphocytes % 11/12/2024 28  24 - 43 % Final    Monocytes % 11/12/2024 6  3 - 12 % Final    Eosinophils % 11/12/2024 3  1 - 4 % Final    Basophils % 11/12/2024 0  0 - 2 % Final    Immature Granulocytes % 11/12/2024 0  0 % Final    Neutrophils Absolute 11/12/2024 4.51  1.50 - 8.10 k/uL Final    Lymphocytes Absolute 11/12/2024 2.07  1.10 - 3.70 k/uL Final    Monocytes Absolute 11/12/2024 0.47  0.10 - 1.20 k/uL Final    Eosinophils Absolute 11/12/2024 0.20  0.00 - 0.44 k/uL Final    Basophils Absolute 11/12/2024 0.03  0.00 - 0.20 k/uL Final    Immature Granulocytes Absolute 11/12/2024 <0.03  0.00 - 0.30 k/uL Final   ]        Below is the assessment and plan developed based on review of pertinent history, physical exam, labs, studies, and medications.    ICD-10-CM    1. Family history of diabetes mellitus  Z83.3       2. BMI 40.0-44.9, adult  Z68.41       3. Irregular menses  N92.6            Assessment:   Diagnosis Orders   1. Family history of diabetes mellitus        2. BMI

## 2024-11-26 ENCOUNTER — OFFICE VISIT (OUTPATIENT)
Dept: NEUROLOGY | Age: 24
End: 2024-11-26
Payer: COMMERCIAL

## 2024-11-26 ENCOUNTER — HOSPITAL ENCOUNTER (OUTPATIENT)
Age: 24
Discharge: HOME | End: 2024-11-26
Payer: COMMERCIAL

## 2024-11-26 VITALS
BODY MASS INDEX: 43.1 KG/M2 | WEIGHT: 268.2 LBS | DIASTOLIC BLOOD PRESSURE: 69 MMHG | SYSTOLIC BLOOD PRESSURE: 123 MMHG | HEART RATE: 87 BPM | HEIGHT: 66 IN

## 2024-11-26 DIAGNOSIS — R79.1 ABNORMAL PARTIAL THROMBOPLASTIN TIME (PTT): ICD-10-CM

## 2024-11-26 DIAGNOSIS — E55.9 VITAMIN D DEFICIENCY: ICD-10-CM

## 2024-11-26 DIAGNOSIS — N92.1 MENORRHAGIA WITH IRREGULAR CYCLE: ICD-10-CM

## 2024-11-26 DIAGNOSIS — G43.001 MIGRAINE WITHOUT AURA AND WITH STATUS MIGRAINOSUS, NOT INTRACTABLE: ICD-10-CM

## 2024-11-26 DIAGNOSIS — G93.2 PSEUDOTUMOR CEREBRI SYNDROME: Primary | ICD-10-CM

## 2024-11-26 DIAGNOSIS — R25.2 HAND OR FOOT SPASMS: ICD-10-CM

## 2024-11-26 DIAGNOSIS — E66.01 OBESITY, CLASS III, BMI 40-49.9 (MORBID OBESITY): ICD-10-CM

## 2024-11-26 LAB
25(OH)D3 SERPL-MCNC: 13.7 NG/ML (ref 30–100)
FOLATE SERPL-MCNC: 6.4 NG/ML (ref 4.8–24.2)
VIT B12 SERPL-MCNC: 465 PG/ML (ref 232–1245)

## 2024-11-26 PROCEDURE — 99214 OFFICE O/P EST MOD 30 MIN: CPT

## 2024-11-26 PROCEDURE — 82306 VITAMIN D 25 HYDROXY: CPT

## 2024-11-26 PROCEDURE — 82607 VITAMIN B-12: CPT

## 2024-11-26 PROCEDURE — 36415 COLL VENOUS BLD VENIPUNCTURE: CPT

## 2024-11-26 PROCEDURE — 82746 ASSAY OF FOLIC ACID SERUM: CPT

## 2024-11-26 PROCEDURE — 82542 COL CHROMOTOGRAPHY QUAL/QUAN: CPT

## 2024-11-26 RX ORDER — ACETAZOLAMIDE 250 MG/1
TABLET ORAL
Qty: 90 TABLET | Refills: 3 | Status: SHIPPED | OUTPATIENT
Start: 2024-11-26

## 2024-11-26 ASSESSMENT — ENCOUNTER SYMPTOMS
SORE THROAT: 0
CONSTIPATION: 0
BACK PAIN: 0
NAUSEA: 0
CHOKING: 0
ABDOMINAL PAIN: 0
PHOTOPHOBIA: 0
DIARRHEA: 0
VOMITING: 0
RHINORRHEA: 0
SHORTNESS OF BREATH: 0
COLOR CHANGE: 0
ABDOMINAL DISTENTION: 0
WHEEZING: 0
COUGH: 0

## 2024-11-26 NOTE — PROGRESS NOTES
VII - normal facial symmetry                                                             VIII - intact hearing                                                                             IX, X - symmetrical palate                                                                  XI - symmetrical shoulder shrug                                                       XII - midline tongue without atrophy or fasciculation     Motor function  Normal muscle bulk and tone  Muscle strength: normal power 5/5  fine motor movements     Sensory function Intact to touch, pin prick, vibration, proprioception in bilateral upper and lower extremities.      Cerebellar Intact fine motor movement. No involuntary movements or tremors     Reflex function Intact 2+ DTR and symmetric. Negative Babinski     Gait                  Normal station and gait           PRIOR TESTS AND IMAGING: Following images and Labs were reviewed by the examiner       Lab Results   Component Value Date    LABA1C 4.7 11/12/2024    TSH 2.00 11/12/2024          MRI brain W/WO 7/27/21  IMPRESSION:   1. Flattening of the posterior globes at the optic nerve insertions   compatible with provided history of papilledema.   2. No intracranial mass, acute infarction, hydrocephalus, or midline shift.     MRV head W/WO 7/27/21  IMPRESSION:   Unremarkable MRV of the head.     IR guided LP 7/27/21  Upon establishing CSF fluid return, the measured opening pressure was 28 cm   of water which is high.  Multiple vials of clear CSF were obtained for a   total of 10 mL.  Closing pressure of 19 cm of water.  The samples were   labeled appropriately. Estimated blood loss was 1 mL. The needle was then   removed and dressing was applied at the puncture site.        IR guided LP completed on 10/2/2024   Opening pressure was borderline bernard with OP 26 cm H2O, no closing pressure documented.       ASSESSMENT       Layla Wilkinson is a 24

## 2024-11-27 DIAGNOSIS — E55.9 VITAMIN D DEFICIENCY: Primary | ICD-10-CM

## 2024-11-27 RX ORDER — CHOLECALCIFEROL (VITAMIN D3) 25 MCG
1000 TABLET ORAL DAILY
Qty: 60 TABLET | Refills: 2 | Status: SHIPPED | OUTPATIENT
Start: 2024-11-27

## 2024-11-29 LAB — CO-ENZYME Q10: 1 MG/L (ref 0.4–1.6)

## 2025-01-01 SDOH — HEALTH STABILITY: PHYSICAL HEALTH: ON AVERAGE, HOW MANY MINUTES DO YOU ENGAGE IN EXERCISE AT THIS LEVEL?: 40 MIN

## 2025-01-01 SDOH — HEALTH STABILITY: PHYSICAL HEALTH: ON AVERAGE, HOW MANY DAYS PER WEEK DO YOU ENGAGE IN MODERATE TO STRENUOUS EXERCISE (LIKE A BRISK WALK)?: 4 DAYS

## 2025-01-01 ASSESSMENT — ENCOUNTER SYMPTOMS
COUGH: 0
ABDOMINAL PAIN: 0
VOMITING: 0
ABDOMINAL DISTENTION: 0
CONSTIPATION: 0
CHEST TIGHTNESS: 0
BACK PAIN: 0
NAUSEA: 0
RHINORRHEA: 0
SORE THROAT: 0
DIARRHEA: 0
SHORTNESS OF BREATH: 0

## 2025-01-01 NOTE — PROGRESS NOTES
on file prior to visit.     SUBJECTIVE:      Review of Systems   Constitutional:  Negative for activity change, fatigue and fever.   HENT:  Negative for congestion, ear pain, rhinorrhea and sore throat.    Respiratory:  Negative for cough, chest tightness and shortness of breath.    Cardiovascular:  Negative for chest pain and palpitations.   Gastrointestinal:  Negative for abdominal distention, abdominal pain, constipation, diarrhea, nausea and vomiting.   Endocrine: Negative for polydipsia, polyphagia and polyuria.   Genitourinary:  Negative for difficulty urinating and dysuria.   Musculoskeletal:  Negative for arthralgias, back pain and myalgias.   Skin:  Negative for rash.   Neurological:  Negative for dizziness, weakness, light-headedness and headaches.   Hematological:  Negative for adenopathy.   Psychiatric/Behavioral:  Negative for agitation and behavioral problems. The patient is not nervous/anxious.      OBJECTIVE: /70   Pulse (!) 108   Temp 98.1 °F (36.7 °C)   Resp 16   Ht 1.676 m (5' 6\")   Wt 122 kg (269 lb)   LMP 12/26/2024   SpO2 99%   BMI 43.42 kg/m²     Physical Exam  Vitals and nursing note reviewed.   Constitutional:       General: She is not in acute distress.     Appearance: Normal appearance. She is well-developed.   HENT:      Head: Normocephalic and atraumatic.   Cardiovascular:      Rate and Rhythm: Normal rate and regular rhythm.      Heart sounds: Normal heart sounds. No murmur heard.  Pulmonary:      Effort: Pulmonary effort is normal. No respiratory distress.      Breath sounds: Normal breath sounds.   Chest:      Chest wall: No tenderness.   Abdominal:      General: Bowel sounds are normal.      Palpations: Abdomen is soft.      Tenderness: There is no abdominal tenderness.   Musculoskeletal:         General: Normal range of motion.      Cervical back: Normal range of motion.      Right lower leg: No edema.      Left lower leg: No edema.   Skin:     General: Skin is warm

## 2025-01-02 ENCOUNTER — OFFICE VISIT (OUTPATIENT)
Dept: FAMILY MEDICINE CLINIC | Age: 25
End: 2025-01-02
Payer: COMMERCIAL

## 2025-01-02 VITALS
RESPIRATION RATE: 16 BRPM | WEIGHT: 269 LBS | OXYGEN SATURATION: 99 % | DIASTOLIC BLOOD PRESSURE: 70 MMHG | TEMPERATURE: 98.1 F | HEART RATE: 108 BPM | BODY MASS INDEX: 43.23 KG/M2 | HEIGHT: 66 IN | SYSTOLIC BLOOD PRESSURE: 118 MMHG

## 2025-01-02 DIAGNOSIS — Z00.00 PREVENTATIVE HEALTH CARE: ICD-10-CM

## 2025-01-02 DIAGNOSIS — G43.001 MIGRAINE WITHOUT AURA AND WITH STATUS MIGRAINOSUS, NOT INTRACTABLE: ICD-10-CM

## 2025-01-02 DIAGNOSIS — G93.2 IIH (IDIOPATHIC INTRACRANIAL HYPERTENSION): ICD-10-CM

## 2025-01-02 DIAGNOSIS — E55.9 VITAMIN D DEFICIENCY: ICD-10-CM

## 2025-01-02 DIAGNOSIS — Z76.89 ENCOUNTER TO ESTABLISH CARE: Primary | ICD-10-CM

## 2025-01-02 DIAGNOSIS — G93.2 PSEUDOTUMOR CEREBRI: ICD-10-CM

## 2025-01-02 PROCEDURE — 99385 PREV VISIT NEW AGE 18-39: CPT | Performed by: NURSE PRACTITIONER

## 2025-01-02 SDOH — ECONOMIC STABILITY: FOOD INSECURITY: WITHIN THE PAST 12 MONTHS, YOU WORRIED THAT YOUR FOOD WOULD RUN OUT BEFORE YOU GOT MONEY TO BUY MORE.: NEVER TRUE

## 2025-01-02 SDOH — ECONOMIC STABILITY: FOOD INSECURITY: WITHIN THE PAST 12 MONTHS, THE FOOD YOU BOUGHT JUST DIDN'T LAST AND YOU DIDN'T HAVE MONEY TO GET MORE.: NEVER TRUE

## 2025-01-02 SDOH — ECONOMIC STABILITY: INCOME INSECURITY: HOW HARD IS IT FOR YOU TO PAY FOR THE VERY BASICS LIKE FOOD, HOUSING, MEDICAL CARE, AND HEATING?: NOT HARD AT ALL

## 2025-01-02 ASSESSMENT — PATIENT HEALTH QUESTIONNAIRE - PHQ9
2. FEELING DOWN, DEPRESSED OR HOPELESS: NOT AT ALL
SUM OF ALL RESPONSES TO PHQ QUESTIONS 1-9: 0
SUM OF ALL RESPONSES TO PHQ QUESTIONS 1-9: 0
1. LITTLE INTEREST OR PLEASURE IN DOING THINGS: NOT AT ALL
SUM OF ALL RESPONSES TO PHQ QUESTIONS 1-9: 0
SUM OF ALL RESPONSES TO PHQ QUESTIONS 1-9: 0
SUM OF ALL RESPONSES TO PHQ9 QUESTIONS 1 & 2: 0

## 2025-02-16 SDOH — ECONOMIC STABILITY: TRANSPORTATION INSECURITY
IN THE PAST 12 MONTHS, HAS THE LACK OF TRANSPORTATION KEPT YOU FROM MEDICAL APPOINTMENTS OR FROM GETTING MEDICATIONS?: NO

## 2025-02-16 SDOH — ECONOMIC STABILITY: INCOME INSECURITY: IN THE LAST 12 MONTHS, WAS THERE A TIME WHEN YOU WERE NOT ABLE TO PAY THE MORTGAGE OR RENT ON TIME?: NO

## 2025-02-16 SDOH — ECONOMIC STABILITY: FOOD INSECURITY: WITHIN THE PAST 12 MONTHS, YOU WORRIED THAT YOUR FOOD WOULD RUN OUT BEFORE YOU GOT MONEY TO BUY MORE.: NEVER TRUE

## 2025-02-16 SDOH — ECONOMIC STABILITY: FOOD INSECURITY: WITHIN THE PAST 12 MONTHS, THE FOOD YOU BOUGHT JUST DIDN'T LAST AND YOU DIDN'T HAVE MONEY TO GET MORE.: NEVER TRUE

## 2025-02-16 SDOH — ECONOMIC STABILITY: TRANSPORTATION INSECURITY
IN THE PAST 12 MONTHS, HAS LACK OF TRANSPORTATION KEPT YOU FROM MEETINGS, WORK, OR FROM GETTING THINGS NEEDED FOR DAILY LIVING?: NO

## 2025-02-19 ENCOUNTER — HOSPITAL ENCOUNTER (OUTPATIENT)
Age: 25
Discharge: HOME | End: 2025-02-19
Payer: COMMERCIAL

## 2025-02-19 DIAGNOSIS — E55.9 VITAMIN D DEFICIENCY: ICD-10-CM

## 2025-02-19 LAB — 25(OH)D3 SERPL-MCNC: 16.7 NG/ML (ref 30–100)

## 2025-02-19 PROCEDURE — 82306 VITAMIN D 25 HYDROXY: CPT

## 2025-02-19 PROCEDURE — 36415 COLL VENOUS BLD VENIPUNCTURE: CPT

## 2025-02-20 RX ORDER — ERGOCALCIFEROL 1.25 MG/1
50000 CAPSULE, LIQUID FILLED ORAL WEEKLY
Qty: 4 CAPSULE | Refills: 3 | Status: SHIPPED | OUTPATIENT
Start: 2025-02-20

## 2025-02-24 PROBLEM — E28.2 PCOS (POLYCYSTIC OVARIAN SYNDROME): Status: ACTIVE | Noted: 2025-02-24

## 2025-02-27 ENCOUNTER — OFFICE VISIT (OUTPATIENT)
Dept: OBGYN CLINIC | Age: 25
End: 2025-02-27
Payer: COMMERCIAL

## 2025-02-27 VITALS
HEIGHT: 66 IN | BODY MASS INDEX: 43.23 KG/M2 | WEIGHT: 269 LBS | SYSTOLIC BLOOD PRESSURE: 116 MMHG | DIASTOLIC BLOOD PRESSURE: 80 MMHG

## 2025-02-27 DIAGNOSIS — N89.8 VAGINAL DISCHARGE: ICD-10-CM

## 2025-02-27 DIAGNOSIS — N89.8 VAGINAL ODOR: Primary | ICD-10-CM

## 2025-02-27 PROCEDURE — 99213 OFFICE O/P EST LOW 20 MIN: CPT | Performed by: NURSE PRACTITIONER

## 2025-02-27 NOTE — PROGRESS NOTES
Layla Wilkinson  2025    YOB: 2000          The patient was seen today. She is here regarding vaginal odor and a little vaginal discharge. Notices odor worse while exercising. Her bowels are regular and she is voiding without difficulty.     HPI:  Layla Wilkinson is a 24 y.o. female  vaginal odor, vaginal discharge      OB History    Para Term  AB Living   0 0 0 0 0 0   SAB IAB Ectopic Molar Multiple Live Births   0 0 0 0 0 0       Past Medical History:   Diagnosis Date    Cholecystitis     gallstones    IIH (idiopathic intracranial hypertension)     Migraine     Post lumbar puncture headache     Pseudotumor cerebri syndrome        Past Surgical History:   Procedure Laterality Date    CHOLECYSTECTOMY, LAPAROSCOPIC  2017    CHOLECYSTECTOMY, LAPAROSCOPIC N/A 2017    CHOLECYSTECTOMY LAPAROSCOPIC, POSSIBLE COMMON BILE DUCT EXPLORATION, POSSIBLE IOC, **SHORT STAY** performed by Red Fraser MD at [facility] OR    LUMBAR PUNCTURE  10/2024    TYMPANOSTOMY TUBE PLACEMENT         Family History   Problem Relation Age of Onset    Other Mother         Ulnar nerve entrapment, TIA’s    Diabetes Mother     Diabetes Father     Other Maternal Grandmother         gallbladder disease    Diabetes Maternal Grandmother     Neuropathy Maternal Grandfather         Due to diabetes    Diabetes Maternal Grandfather     Other Paternal Grandmother         gallbladder disease    Neuropathy Paternal Grandmother         In the legs    Other Paternal Grandfather         He had supranuclear palsy    Neuropathy Maternal Uncle         Due to diabetes    Stroke Paternal Uncle     Ovarian Cancer Maternal Great Grandmother     Gout Paternal Uncle     Learning Disabilities Sister     Bleeding Prob Neg Hx     Seizures Neg Hx     Anesth Problems Neg Hx        Social History     Socioeconomic History    Marital status: Single     Spouse name: Not on file    Number of children: Not on file    Years

## 2025-03-04 ENCOUNTER — TELEMEDICINE (OUTPATIENT)
Dept: OBGYN CLINIC | Age: 25
End: 2025-03-04
Payer: COMMERCIAL

## 2025-03-04 DIAGNOSIS — N89.8 VAGINAL ODOR: Primary | ICD-10-CM

## 2025-03-04 DIAGNOSIS — N89.8 VAGINAL DISCHARGE: ICD-10-CM

## 2025-03-04 DIAGNOSIS — N92.6 IRREGULAR MENSES: ICD-10-CM

## 2025-03-04 PROCEDURE — 99213 OFFICE O/P EST LOW 20 MIN: CPT | Performed by: OBSTETRICS & GYNECOLOGY

## 2025-03-05 NOTE — PROGRESS NOTES
anxiety  Breast ROS: No breast abnormalities or lumps  Respiratory ROS: No SOB, Pneumoniae,Cough, or Pulmonary Embolism   Cardiovascular ROS: No Chest Pain with Exertion, Palpitations, Syncope, Edema, Arrhythmia  Gastrointestinal ROS: No Indigestion, Heartburn, Nausea, vomiting, Diarrhea, Constipation,or Bowel Changes; No Bloody Stools or melena  Genito-Urinary ROS: No Dysuria, Hematuria or Nocturia. No Urinary Incontinence or Vaginal Discharge; +GYN problem of irreg menses  Musculoskeletal ROS: No Arthralgia, Arthritis,Gout,Osteoporosis or Rheumatism  Neurological ROS: No CVA, Migraines, Epilepsy, Seizure Hx, or Limb Weakness  Dermatological ROS: No Rash, Itching, Hives, Mole Changes or Cancer                                                      Last menstrual period 02/01/2025, not currently breastfeeding.    PE is limited due to the virtual visit    Abdomen: Soft non-tender; good bowel sounds. No guarding, rebound or rigidity. No CVA tenderness bilaterally reported when questioned. (Viewed Virtually)    Extremities: No calf tenderness, DTR 2/4, and No edema bilaterally as inspected by video and palpation by the patient (Viewed Virtually)    Pelvic: (Virtual Visit-Not Completed)    Diagnostics:  No results found.     Lab Results:  Hospital Outpatient Visit on 02/19/2025   Component Date Value Ref Range Status    Vit D, 25-Hydroxy 02/19/2025 16.7 (L)  30.0 - 100.0 ng/mL Final    Comment:    Reference Range:  Vitamin D status         Range   Deficiency              <20 ng/mL   Mild Deficiency       20-30 ng/mL   Sufficiency           ng/mL   Toxicity               >100 ng/mL     ]        Below is the assessment and plan developed based on review of pertinent history, physical exam, labs, studies, and medications.    ICD-10-CM    1. Vaginal odor  N89.8       2. Vaginal discharge  N89.8       3. Irregular menses  N92.6            Assessment:   Diagnosis Orders   1. Vaginal odor        2. Vaginal discharge

## 2025-06-10 ENCOUNTER — OFFICE VISIT (OUTPATIENT)
Dept: NEUROLOGY | Age: 25
End: 2025-06-10
Payer: COMMERCIAL

## 2025-06-10 VITALS
HEIGHT: 66 IN | BODY MASS INDEX: 43.71 KG/M2 | DIASTOLIC BLOOD PRESSURE: 81 MMHG | SYSTOLIC BLOOD PRESSURE: 130 MMHG | HEART RATE: 90 BPM | WEIGHT: 272 LBS

## 2025-06-10 DIAGNOSIS — E55.9 VITAMIN D DEFICIENCY: Primary | ICD-10-CM

## 2025-06-10 DIAGNOSIS — G93.2 PSEUDOTUMOR CEREBRI SYNDROME: ICD-10-CM

## 2025-06-10 PROCEDURE — 99214 OFFICE O/P EST MOD 30 MIN: CPT

## 2025-06-10 RX ORDER — ERGOCALCIFEROL 1.25 MG/1
50000 CAPSULE, LIQUID FILLED ORAL WEEKLY
Qty: 4 CAPSULE | Refills: 3 | Status: SHIPPED | OUTPATIENT
Start: 2025-06-10

## 2025-06-10 NOTE — PROGRESS NOTES
caffeinated beverages to 1-2 cups per day.   Get 7-9 hours of sleep per night.  Treat high blood pressure and sleep apnea if present.  Avoid triggers.  These may include alcohol and certain foods like processed meats and aged cheese.   Avoid smoking or being around those who smoke cigarettes.   Drink 6-8 glasses of water per day unless you are limiting fluids under the direction of a healthcare professional.  Take abortive medications as soon as possible with a full glass of water.        3. Obesity, Class III, BMI 40-49.9 (morbid obesity)  - advised to continue with weight loss    4. Vitamin D deficiency  - Vit D 50 once weekly X 6 weeks( refilled)    - RTC in 1 year.        Instructed patient to call the clinic if symptoms worsen or develop any new symptoms.         Ms. Wilkinson received counseling on the following healthy behaviors: medical compliance, smoking cessation, blood pressure control, regular follow up with primary doctor.      I have spent 30 minutes face to face with the patient more than 50% of this time was spent counseling and coordinating care.    Leonard Ramirez,   PGY2 Neurology    Electronically signed by Leonard Ramirez MD on 6/10/2025 at 2:07 PM

## 2025-06-20 ENCOUNTER — OFFICE VISIT (OUTPATIENT)
Dept: FAMILY MEDICINE CLINIC | Age: 25
End: 2025-06-20
Payer: COMMERCIAL

## 2025-06-20 VITALS
OXYGEN SATURATION: 98 % | HEART RATE: 94 BPM | TEMPERATURE: 97.3 F | SYSTOLIC BLOOD PRESSURE: 146 MMHG | DIASTOLIC BLOOD PRESSURE: 82 MMHG

## 2025-06-20 DIAGNOSIS — R09.82 POSTNASAL DRIP: ICD-10-CM

## 2025-06-20 DIAGNOSIS — J02.9 SORE THROAT: ICD-10-CM

## 2025-06-20 DIAGNOSIS — J06.9 VIRAL URI: Primary | ICD-10-CM

## 2025-06-20 LAB — S PYO AG THROAT QL: NORMAL

## 2025-06-20 PROCEDURE — 87880 STREP A ASSAY W/OPTIC: CPT | Performed by: NURSE PRACTITIONER

## 2025-06-20 PROCEDURE — 99213 OFFICE O/P EST LOW 20 MIN: CPT | Performed by: NURSE PRACTITIONER

## 2025-06-20 RX ORDER — FLUTICASONE PROPIONATE 50 MCG
2 SPRAY, SUSPENSION (ML) NASAL DAILY
Qty: 16 G | Refills: 0 | Status: SHIPPED | OUTPATIENT
Start: 2025-06-20

## 2025-06-20 ASSESSMENT — ENCOUNTER SYMPTOMS
NAUSEA: 0
VISUAL CHANGE: 0
RESPIRATORY NEGATIVE: 1
VOMITING: 0
ABDOMINAL PAIN: 0
SORE THROAT: 1
SWOLLEN GLANDS: 0
GASTROINTESTINAL NEGATIVE: 1
COUGH: 0
CHANGE IN BOWEL HABIT: 0

## 2025-06-20 NOTE — PROGRESS NOTES
[facility] PHYSICIANS [facility] WALK-IN FAMILY MEDICINE  [address]  [address]  [facility] [address]  Dept: [phone]  Dept Fax: [phone]    Layla Wilkinson is a 24 y.o. female who presents to the urgent care today for her medical conditions/complaints as notedbelow.  Layla Wilkinson is c/o of Pharyngitis (Onset since Sunday with dry throat and ear fullness.)      HPI:     24 yr old female presents for sore throat, dry throat, miranda ear fullness for 5 days  Wants swabbed for strep throat  No known ill exposures    Pharyngitis  This is a new problem. The current episode started in the past 7 days (5d). The problem occurs constantly. The problem has been unchanged. Associated symptoms include congestion, headaches (x1 day) and a sore throat. Pertinent negatives include no abdominal pain, anorexia, arthralgias, change in bowel habit, chest pain, chills, coughing, diaphoresis, fatigue, fever, joint swelling, myalgias, nausea, neck pain, numbness, rash, swollen glands, urinary symptoms, vertigo, visual change, vomiting or weakness. The symptoms are aggravated by swallowing. She has tried nothing for the symptoms. The treatment provided no relief.       Past Medical History:   Diagnosis Date    Cholecystitis     gallstones    IIH (idiopathic intracranial hypertension)     Migraine     Post lumbar puncture headache     Pseudotumor cerebri syndrome         Current Outpatient Medications   Medication Sig Dispense Refill    fluticasone (FLONASE) 50 MCG/ACT nasal spray 2 sprays by Each Nostril route daily 16 g 0    vitamin D (ERGOCALCIFEROL) 1.25 MG (23499 UT) CAPS capsule Take 1 capsule by mouth once a week 4 capsule 3    timolol (TIMOPTIC) 0.5 % ophthalmic solution INSTILL 1 DROP INTO EACH EYE TWICE DAILY AS DIRECTED      vitamin D3 (CHOLECALCIFEROL) 25 MCG (1000 UT) TABS tablet Take 1 tablet by mouth daily 60 tablet 2    acetaZOLAMIDE (DIAMOX) 250 MG tablet Start taking

## 2025-06-20 NOTE — PROGRESS NOTES
[facility] WALK-IN FAMILY MEDICINE  [address]  [address]  [facility] [address]  Dept: [phone]  Dept Fax: [phone]    Layla Wilkinson is a 24 y.o. female who presents to the urgent care today for her medical conditions/complaints as notedbelow.  Layla Wilkinson is c/o of Pharyngitis (Onset since Sunday with dry throat and ear fullness.)      HPI:     HPI    Past Medical History:   Diagnosis Date    Cholecystitis     gallstones    IIH (idiopathic intracranial hypertension)     Migraine     Post lumbar puncture headache     Pseudotumor cerebri syndrome         Current Outpatient Medications   Medication Sig Dispense Refill    vitamin D (ERGOCALCIFEROL) 1.25 MG (55425 UT) CAPS capsule Take 1 capsule by mouth once a week 4 capsule 3    timolol (TIMOPTIC) 0.5 % ophthalmic solution INSTILL 1 DROP INTO EACH EYE TWICE DAILY AS DIRECTED      vitamin D3 (CHOLECALCIFEROL) 25 MCG (1000 UT) TABS tablet Take 1 tablet by mouth daily 60 tablet 2    acetaZOLAMIDE (DIAMOX) 250 MG tablet Start taking one pill in am and two pills at pm for one week then two pills twice a day afterwards 90 tablet 3    metFORMIN (GLUCOPHAGE-XR) 500 MG extended release tablet Take 1 tablet by mouth in the morning and at bedtime 60 tablet 5    latanoprost (XALATAN) 0.005 % ophthalmic solution Place 1 drop into both eyes nightly 1 drop in OU nightly      timolol (TIMOPTIC) 0.25 % ophthalmic solution Place 1 drop into both eyes 2 times daily 1 drop OU BID       No current facility-administered medications for this visit.     No Known Allergies    Subjective:      Review of Systems  14 systems reviewed and negative except as listed in HPI.    Objective:     Physical Exam  BP (!) 146/82   Pulse 94   Temp 97.3 °F (36.3 °C) (Infrared)   SpO2 98%     :   Assessment & Plan    Diagnosis Orders   1. Sore throat  POCT rapid strep A          Plan:      1. Sore throat  -     POCT rapid

## 2025-08-18 ENCOUNTER — TELEPHONE (OUTPATIENT)
Dept: NEUROLOGY | Age: 25
End: 2025-08-18

## (undated) DEVICE — SUTURE PERMAHAND SZ 2-0 L30IN NONABSORBABLE BLK L17MM RB-1 K873H

## (undated) DEVICE — SUTURE VCRL + SZ 0 L27IN ABSRB VLT L26MM UR-6 5/8 CIR VCP603H

## (undated) DEVICE — DECANTER FLD 9IN ST BG FOR ASEP TRNSF OF FLD

## (undated) DEVICE — TOWEL SURG W16XL26IN WHT NONFENESTRATED ST 4 PER PK

## (undated) DEVICE — SOLUTION ANTIFOG VIS SYS CLEARIFY LAPSCP

## (undated) DEVICE — INSUFFLATION NEEDLE TO ESTABLISH PNEUMOPERITONEUM.: Brand: INSUFFLATION NEEDLE

## (undated) DEVICE — SYRINGE MED 10ML POLYPR LUERSLIP TIP FLAT TOP W/O SFTY DISP

## (undated) DEVICE — TROCAR ENDOSCP DIA10MM SHT CANN DIL W/ RADIALLY SELF EXP SL

## (undated) DEVICE — Device

## (undated) DEVICE — TROCAR ENDOSCP L90MM DIA5MM SHT CANN DIL W/ RADIALLY SELF

## (undated) DEVICE — GOWN,AURORA,NONREINFORCED,LARGE: Brand: MEDLINE

## (undated) DEVICE — GARMENT COMPR STD FOR 17IN CALF UNIF THER FLOTRN

## (undated) DEVICE — ADVANCE BILIARY BALLOON CATHETER: Brand: ADVANCE

## (undated) DEVICE — ELECTRODE ELECSURG NDL 2.8 INX7.2 CM COAT INSUL EDGE

## (undated) DEVICE — CANNULA ENDOSCP 5MM DIL BLDELSS TIP STP

## (undated) DEVICE — APPLIER CLP M/L SHFT DIA5MM 15 LIG LIGAMAX 5

## (undated) DEVICE — CYSTIC DUCT INTRODUCER SET: Brand: COOK

## (undated) DEVICE — STRIP,CLOSURE,WOUND,MEDI-STRIP,1/2X4: Brand: MEDLINE

## (undated) DEVICE — GLOVE ORANGE PI 7 1/2   MSG9075

## (undated) DEVICE — SOLUTION IV 1000ML 0.9% SOD CHL PH 5 INJ USP VIAFLX PLAS

## (undated) DEVICE — BANDAGE ADH W0.75XL3IN NAT PLAS CURAD

## (undated) DEVICE — BAG SPEC LAP H6IN DIA3IN 250ML 10 12MM CANN ATTCH MEM WIRE

## (undated) DEVICE — SCISSOR SURG CRV ENDOCUT TIP FOR LAP DISP

## (undated) DEVICE — ELECTRODE LAPAROSCOPIC LHOOK

## (undated) DEVICE — ROADRUNNER, PC WIRE GUIDE NIMBLE,: Brand: ROADRUNNER

## (undated) DEVICE — ADHESIVE SKIN CLSR 0.7ML TOP DERMBND ADV

## (undated) DEVICE — Z DISCONTINUED USE 2270995 CATHETER URETH 16FR L16IN RED RUB INTMIT RND HLLW TIP 2 OPP

## (undated) DEVICE — GLOVE ORANGE PI 7   MSG9070

## (undated) DEVICE — TROCAR ENDOSCP L100MM DIA12MM BLDELSS CANN DIL W/ RADIALLY

## (undated) DEVICE — LOOP LIG SUT SZ 0 L18IN ABSRB POLYDIOXANONE MFIL PDS II

## (undated) DEVICE — STANDARD HYPODERMIC NEEDLE,ALUMINUM HUB: Brand: MONOJECT

## (undated) DEVICE — CHLORAPREP 26ML ORANGE

## (undated) DEVICE — DILATOR AND CANNULA WITH RADIALLY EXPANDABLE SLEEVE: Brand: STEP

## (undated) DEVICE — SPONGE GZ W3XL3IN 4 PLY RAYON POLY STD NONWOVEN

## (undated) DEVICE — NEEDLE SPNL 22GA L3.5IN BLK HUB S STL REG WALL FIT STYL W/

## (undated) DEVICE — 3M™ TEGADERM™ TRANSPARENT FILM DRESSING FRAME STYLE, 1626W, 4 IN X 4-3/4 IN (10 CM X 12 CM), 50/CT 4CT/CASE: Brand: 3M™ TEGADERM™

## (undated) DEVICE — Z CONVERTED USE 2271043 CONTAINER SPEC COLL 4OZ SCR ON LID PEEL PCH

## (undated) DEVICE — PACK LAP BASIC

## (undated) DEVICE — MAGNETIC IMPLANT S1000-00: Brand: SOPHONO™

## (undated) DEVICE — GLOVE ORANGE PI 8 1/2   MSG9085

## (undated) DEVICE — 3M™ IOBAN™ 2 ANTIMICROBIAL INCISE DRAPE 6650EZ: Brand: IOBAN™ 2

## (undated) DEVICE — APPLIER CLP M L L11.4IN DIA10MM ENDOSCP ROT MULT FOR LIG

## (undated) DEVICE — CANNULA ENDOSCP 5MM SHT DIL MINI STP